# Patient Record
Sex: MALE | Race: WHITE | Employment: UNEMPLOYED | URBAN - METROPOLITAN AREA
[De-identification: names, ages, dates, MRNs, and addresses within clinical notes are randomized per-mention and may not be internally consistent; named-entity substitution may affect disease eponyms.]

---

## 2017-01-10 PROBLEM — S63.659D SAGITTAL BAND RUPTURE AT METACARPOPHALANGEAL JOINT, SUBSEQUENT ENCOUNTER: Status: ACTIVE | Noted: 2017-01-10

## 2017-05-23 PROBLEM — Z48.89 AFTERCARE FOLLOWING SURGERY: Status: ACTIVE | Noted: 2017-05-23

## 2017-06-30 PROCEDURE — 82746 ASSAY OF FOLIC ACID SERUM: CPT | Performed by: INTERNAL MEDICINE

## 2017-06-30 PROCEDURE — 82607 VITAMIN B-12: CPT | Performed by: INTERNAL MEDICINE

## 2017-11-22 PROBLEM — R52 SCAR PAINFUL: Status: ACTIVE | Noted: 2017-11-22

## 2017-11-22 PROBLEM — L90.5 SCAR PAINFUL: Status: ACTIVE | Noted: 2017-11-22

## 2018-04-13 PROCEDURE — 81015 MICROSCOPIC EXAM OF URINE: CPT | Performed by: UROLOGY

## 2018-08-21 PROCEDURE — 82746 ASSAY OF FOLIC ACID SERUM: CPT | Performed by: INTERNAL MEDICINE

## 2018-08-21 PROCEDURE — 82607 VITAMIN B-12: CPT | Performed by: INTERNAL MEDICINE

## 2019-01-21 NOTE — H&P
[address]    PATIENT'S NAME: Raymon Mcclendon   ATTENDING PHYSICIAN: Ajit Bland M.D.    PATIENT ACCOUNT#:   [de-identified]    LOCATION:    MEDICAL RECORD #:   [id number]       YOB: 1965  ADMISSION DATE:       02/01/2019    HISTORY AND [facility]. That study did demonstrate glenohumeral joint osteoarthritis. There were no articular surface or full-thickness rotator cuff tears. There was no fatty atrophy of the rotator cuff.   The long head of the biceps appeared to be normal.  There repair of right middle digit, radial sagittal band and right extensor tendon centralization 2017; spinal cord neurostimulator 2016; trial spinal cord stimulator; epidural injections; hemorrhoid ligation; C5-C6 hemicorpectomy with anterior cervical decompre M.D.  d: 01/20/2019 14:00:28  t: 01/20/2019 14:43:54  [facility] [id number]  [facility]/    cc: SIRENA Lara Dr. No

## 2019-01-23 RX ORDER — ALBUTEROL SULFATE 90 UG/1
AEROSOL, METERED RESPIRATORY (INHALATION) EVERY 6 HOURS PRN
COMMUNITY
End: 2019-06-10

## 2019-01-28 NOTE — H&P
[facility]    PATIENT'S NAME: Surekha Olga   ATTENDING PHYSICIAN: Uzma Tijerina M.D.    PATIENT ACCOUNT#:   [de-identified]    LOCATION:    MEDICAL RECORD #:   [id number]       YOB: 1965  ADMISSION DATE:       02/01/2019    HISTORY AND PH

## 2019-02-01 ENCOUNTER — ANESTHESIA (OUTPATIENT)
Dept: SURGERY | Facility: HOSPITAL | Age: 54
DRG: 483 | End: 2019-02-01
Payer: COMMERCIAL

## 2019-02-01 ENCOUNTER — APPOINTMENT (OUTPATIENT)
Dept: GENERAL RADIOLOGY | Facility: HOSPITAL | Age: 54
DRG: 483 | End: 2019-02-01
Attending: ORTHOPAEDIC SURGERY
Payer: COMMERCIAL

## 2019-02-01 ENCOUNTER — ANESTHESIA EVENT (OUTPATIENT)
Dept: SURGERY | Facility: HOSPITAL | Age: 54
DRG: 483 | End: 2019-02-01
Payer: COMMERCIAL

## 2019-02-01 ENCOUNTER — HOSPITAL ENCOUNTER (INPATIENT)
Facility: HOSPITAL | Age: 54
LOS: 1 days | Discharge: HOME | DRG: 483 | End: 2019-02-02
Attending: ORTHOPAEDIC SURGERY | Admitting: SURGERY
Payer: COMMERCIAL

## 2019-02-01 DIAGNOSIS — M19.012 PRIMARY OSTEOARTHRITIS OF LEFT SHOULDER: ICD-10-CM

## 2019-02-01 PROBLEM — M19.019 OSTEOARTHRITIS, SHOULDER: Status: ACTIVE | Noted: 2019-02-01

## 2019-02-01 PROBLEM — M19.011 PRIMARY OSTEOARTHRITIS OF RIGHT SHOULDER: Status: ACTIVE | Noted: 2019-02-01

## 2019-02-01 PROBLEM — M19.011 PRIMARY OSTEOARTHRITIS OF RIGHT SHOULDER: Status: RESOLVED | Noted: 2019-02-01 | Resolved: 2019-02-01

## 2019-02-01 LAB
ANTIBODY SCREEN: NEGATIVE
RH BLOOD TYPE: NEGATIVE

## 2019-02-01 PROCEDURE — 3E0T3BZ INTRODUCTION OF ANESTHETIC AGENT INTO PERIPHERAL NERVES AND PLEXI, PERCUTANEOUS APPROACH: ICD-10-PCS | Performed by: ORTHOPAEDIC SURGERY

## 2019-02-01 PROCEDURE — 86901 BLOOD TYPING SEROLOGIC RH(D): CPT

## 2019-02-01 PROCEDURE — 88305 TISSUE EXAM BY PATHOLOGIST: CPT | Performed by: ORTHOPAEDIC SURGERY

## 2019-02-01 PROCEDURE — 86850 RBC ANTIBODY SCREEN: CPT

## 2019-02-01 PROCEDURE — 73020 X-RAY EXAM OF SHOULDER: CPT | Performed by: ORTHOPAEDIC SURGERY

## 2019-02-01 PROCEDURE — 88311 DECALCIFY TISSUE: CPT | Performed by: ORTHOPAEDIC SURGERY

## 2019-02-01 PROCEDURE — 86900 BLOOD TYPING SEROLOGIC ABO: CPT

## 2019-02-01 PROCEDURE — 0RRK0JZ REPLACEMENT OF LEFT SHOULDER JOINT WITH SYNTHETIC SUBSTITUTE, OPEN APPROACH: ICD-10-PCS | Performed by: ORTHOPAEDIC SURGERY

## 2019-02-01 PROCEDURE — 76942 ECHO GUIDE FOR BIOPSY: CPT | Performed by: ORTHOPAEDIC SURGERY

## 2019-02-01 DEVICE — BIOMET BC R 1X40 US: Type: IMPLANTABLE DEVICE | Site: SHOULDER | Status: FUNCTIONAL

## 2019-02-01 RX ORDER — TRAMADOL HYDROCHLORIDE 50 MG/1
50 TABLET ORAL EVERY 8 HOURS PRN
Status: ON HOLD | COMMUNITY
End: 2019-02-02

## 2019-02-01 RX ORDER — SODIUM CHLORIDE, SODIUM LACTATE, POTASSIUM CHLORIDE, CALCIUM CHLORIDE 600; 310; 30; 20 MG/100ML; MG/100ML; MG/100ML; MG/100ML
INJECTION, SOLUTION INTRAVENOUS CONTINUOUS
Status: DISCONTINUED | OUTPATIENT
Start: 2019-02-01 | End: 2019-02-02

## 2019-02-01 RX ORDER — SCOLOPAMINE TRANSDERMAL SYSTEM 1 MG/1
1 PATCH, EXTENDED RELEASE TRANSDERMAL ONCE
Status: DISCONTINUED | OUTPATIENT
Start: 2019-02-01 | End: 2019-02-02

## 2019-02-01 RX ORDER — ACETAMINOPHEN 325 MG/1
650 TABLET ORAL ONCE
Status: DISCONTINUED | OUTPATIENT
Start: 2019-02-01 | End: 2019-02-01 | Stop reason: HOSPADM

## 2019-02-01 RX ORDER — ACETAMINOPHEN 500 MG
1000 TABLET ORAL ONCE AS NEEDED
Status: DISCONTINUED | OUTPATIENT
Start: 2019-02-01 | End: 2019-02-01 | Stop reason: HOSPADM

## 2019-02-01 RX ORDER — METOCLOPRAMIDE HYDROCHLORIDE 5 MG/ML
10 INJECTION INTRAMUSCULAR; INTRAVENOUS AS NEEDED
Status: DISCONTINUED | OUTPATIENT
Start: 2019-02-01 | End: 2019-02-01 | Stop reason: HOSPADM

## 2019-02-01 RX ORDER — SCOLOPAMINE TRANSDERMAL SYSTEM 1 MG/1
PATCH, EXTENDED RELEASE TRANSDERMAL
Status: DISCONTINUED
Start: 2019-02-01 | End: 2019-02-02

## 2019-02-01 RX ORDER — SODIUM CHLORIDE, SODIUM LACTATE, POTASSIUM CHLORIDE, CALCIUM CHLORIDE 600; 310; 30; 20 MG/100ML; MG/100ML; MG/100ML; MG/100ML
INJECTION, SOLUTION INTRAVENOUS CONTINUOUS
Status: DISCONTINUED | OUTPATIENT
Start: 2019-02-01 | End: 2019-02-01 | Stop reason: HOSPADM

## 2019-02-01 RX ORDER — ALBUTEROL SULFATE 90 UG/1
1 AEROSOL, METERED RESPIRATORY (INHALATION) EVERY 6 HOURS PRN
Status: DISCONTINUED | OUTPATIENT
Start: 2019-02-01 | End: 2019-02-02

## 2019-02-01 RX ORDER — BENZONATATE 200 MG/1
200 CAPSULE ORAL 3 TIMES DAILY
Status: DISCONTINUED | OUTPATIENT
Start: 2019-02-01 | End: 2019-02-02

## 2019-02-01 RX ORDER — OXYCODONE HYDROCHLORIDE 10 MG/1
10 TABLET ORAL EVERY 4 HOURS PRN
Status: DISCONTINUED | OUTPATIENT
Start: 2019-02-01 | End: 2019-02-02

## 2019-02-01 RX ORDER — KETOROLAC TROMETHAMINE 30 MG/ML
30 INJECTION, SOLUTION INTRAMUSCULAR; INTRAVENOUS EVERY 6 HOURS
Status: DISCONTINUED | OUTPATIENT
Start: 2019-02-01 | End: 2019-02-02

## 2019-02-01 RX ORDER — ACETAMINOPHEN 325 MG/1
650 TABLET ORAL ONCE
Status: COMPLETED | OUTPATIENT
Start: 2019-02-01 | End: 2019-02-01

## 2019-02-01 RX ORDER — HYDROMORPHONE HYDROCHLORIDE 1 MG/ML
0.8 INJECTION, SOLUTION INTRAMUSCULAR; INTRAVENOUS; SUBCUTANEOUS EVERY 2 HOUR PRN
Status: DISCONTINUED | OUTPATIENT
Start: 2019-02-01 | End: 2019-02-02

## 2019-02-01 RX ORDER — HYDROMORPHONE HYDROCHLORIDE 1 MG/ML
INJECTION, SOLUTION INTRAMUSCULAR; INTRAVENOUS; SUBCUTANEOUS
Status: COMPLETED
Start: 2019-02-01 | End: 2019-02-01

## 2019-02-01 RX ORDER — SODIUM CHLORIDE, SODIUM LACTATE, POTASSIUM CHLORIDE, CALCIUM CHLORIDE 600; 310; 30; 20 MG/100ML; MG/100ML; MG/100ML; MG/100ML
INJECTION, SOLUTION INTRAVENOUS CONTINUOUS
Status: DISCONTINUED | OUTPATIENT
Start: 2019-02-01 | End: 2019-02-01

## 2019-02-01 RX ORDER — HYDROMORPHONE HYDROCHLORIDE 1 MG/ML
0.8 INJECTION, SOLUTION INTRAMUSCULAR; INTRAVENOUS; SUBCUTANEOUS EVERY 2 HOUR PRN
Status: DISCONTINUED | OUTPATIENT
Start: 2019-02-01 | End: 2019-02-01

## 2019-02-01 RX ORDER — OXYCODONE HYDROCHLORIDE 10 MG/1
20 TABLET ORAL EVERY 4 HOURS PRN
Status: DISCONTINUED | OUTPATIENT
Start: 2019-02-01 | End: 2019-02-02

## 2019-02-01 RX ORDER — DULOXETIN HYDROCHLORIDE 30 MG/1
90 CAPSULE, DELAYED RELEASE ORAL DAILY
Status: DISCONTINUED | OUTPATIENT
Start: 2019-02-02 | End: 2019-02-02

## 2019-02-01 RX ORDER — DIAZEPAM 10 MG/1
10 TABLET ORAL NIGHTLY PRN
Status: DISCONTINUED | OUTPATIENT
Start: 2019-02-01 | End: 2019-02-02

## 2019-02-01 RX ORDER — ACETAMINOPHEN 500 MG
1000 TABLET ORAL ONCE
Status: DISCONTINUED | OUTPATIENT
Start: 2019-02-01 | End: 2019-02-01

## 2019-02-01 RX ORDER — OXYCODONE HCL 10 MG/1
TABLET, FILM COATED, EXTENDED RELEASE ORAL
Status: COMPLETED
Start: 2019-02-01 | End: 2019-02-01

## 2019-02-01 RX ORDER — ONDANSETRON 2 MG/ML
4 INJECTION INTRAMUSCULAR; INTRAVENOUS AS NEEDED
Status: DISCONTINUED | OUTPATIENT
Start: 2019-02-01 | End: 2019-02-01 | Stop reason: HOSPADM

## 2019-02-01 RX ORDER — HYDROMORPHONE HYDROCHLORIDE 1 MG/ML
0.2 INJECTION, SOLUTION INTRAMUSCULAR; INTRAVENOUS; SUBCUTANEOUS EVERY 2 HOUR PRN
Status: DISCONTINUED | OUTPATIENT
Start: 2019-02-01 | End: 2019-02-01

## 2019-02-01 RX ORDER — SCOLOPAMINE TRANSDERMAL SYSTEM 1 MG/1
1 PATCH, EXTENDED RELEASE TRANSDERMAL ONCE
Status: DISCONTINUED | OUTPATIENT
Start: 2019-02-01 | End: 2019-02-01

## 2019-02-01 RX ORDER — HYDROMORPHONE HYDROCHLORIDE 1 MG/ML
0.2 INJECTION, SOLUTION INTRAMUSCULAR; INTRAVENOUS; SUBCUTANEOUS EVERY 2 HOUR PRN
Status: DISCONTINUED | OUTPATIENT
Start: 2019-02-01 | End: 2019-02-02

## 2019-02-01 RX ORDER — SCOLOPAMINE TRANSDERMAL SYSTEM 1 MG/1
1 PATCH, EXTENDED RELEASE TRANSDERMAL ONCE
Status: DISCONTINUED | OUTPATIENT
Start: 2019-02-01 | End: 2019-02-01 | Stop reason: HOSPADM

## 2019-02-01 RX ORDER — HYDROMORPHONE HYDROCHLORIDE 1 MG/ML
0.4 INJECTION, SOLUTION INTRAMUSCULAR; INTRAVENOUS; SUBCUTANEOUS EVERY 5 MIN PRN
Status: DISCONTINUED | OUTPATIENT
Start: 2019-02-01 | End: 2019-02-01 | Stop reason: HOSPADM

## 2019-02-01 RX ORDER — TIZANIDINE 4 MG/1
4 TABLET ORAL 3 TIMES DAILY PRN
Status: DISCONTINUED | OUTPATIENT
Start: 2019-02-01 | End: 2019-02-01

## 2019-02-01 RX ORDER — POLYETHYLENE GLYCOL 3350 17 G/17G
17 POWDER, FOR SOLUTION ORAL DAILY PRN
Status: DISCONTINUED | OUTPATIENT
Start: 2019-02-01 | End: 2019-02-02

## 2019-02-01 RX ORDER — KETOROLAC TROMETHAMINE 30 MG/ML
30 INJECTION, SOLUTION INTRAMUSCULAR; INTRAVENOUS EVERY 6 HOURS
Status: DISCONTINUED | OUTPATIENT
Start: 2019-02-01 | End: 2019-02-01

## 2019-02-01 RX ORDER — METOCLOPRAMIDE HYDROCHLORIDE 5 MG/ML
INJECTION INTRAMUSCULAR; INTRAVENOUS
Status: COMPLETED
Start: 2019-02-01 | End: 2019-02-01

## 2019-02-01 RX ORDER — MIDAZOLAM HYDROCHLORIDE 1 MG/ML
1 INJECTION INTRAMUSCULAR; INTRAVENOUS EVERY 5 MIN PRN
Status: DISCONTINUED | OUTPATIENT
Start: 2019-02-01 | End: 2019-02-01 | Stop reason: HOSPADM

## 2019-02-01 RX ORDER — DIPHENHYDRAMINE HYDROCHLORIDE 50 MG/ML
25 INJECTION INTRAMUSCULAR; INTRAVENOUS ONCE AS NEEDED
Status: ACTIVE | OUTPATIENT
Start: 2019-02-01 | End: 2019-02-01

## 2019-02-01 RX ORDER — LABETALOL HYDROCHLORIDE 5 MG/ML
5 INJECTION, SOLUTION INTRAVENOUS EVERY 5 MIN PRN
Status: DISCONTINUED | OUTPATIENT
Start: 2019-02-01 | End: 2019-02-01 | Stop reason: HOSPADM

## 2019-02-01 RX ORDER — TIZANIDINE 4 MG/1
4 TABLET ORAL 3 TIMES DAILY PRN
Status: DISCONTINUED | OUTPATIENT
Start: 2019-02-01 | End: 2019-02-02

## 2019-02-01 RX ORDER — METOCLOPRAMIDE HYDROCHLORIDE 5 MG/ML
10 INJECTION INTRAMUSCULAR; INTRAVENOUS EVERY 6 HOURS PRN
Status: DISCONTINUED | OUTPATIENT
Start: 2019-02-01 | End: 2019-02-02

## 2019-02-01 RX ORDER — OXYCODONE HYDROCHLORIDE 15 MG/1
15 TABLET ORAL EVERY 4 HOURS PRN
Status: DISCONTINUED | OUTPATIENT
Start: 2019-02-01 | End: 2019-02-01

## 2019-02-01 RX ORDER — OXYCODONE HCL 10 MG/1
10 TABLET, FILM COATED, EXTENDED RELEASE ORAL
Status: ACTIVE | OUTPATIENT
Start: 2019-02-02 | End: 2019-02-02

## 2019-02-01 RX ORDER — OXYCODONE HYDROCHLORIDE 15 MG/1
15 TABLET ORAL EVERY 4 HOURS PRN
Status: DISCONTINUED | OUTPATIENT
Start: 2019-02-01 | End: 2019-02-02

## 2019-02-01 RX ORDER — HYDROCODONE BITARTRATE AND ACETAMINOPHEN 10; 325 MG/1; MG/1
TABLET ORAL
Qty: 60 TABLET | Refills: 0 | Status: SHIPPED | OUTPATIENT
Start: 2019-02-01 | End: 2019-03-04

## 2019-02-01 RX ORDER — OXYCODONE HCL 10 MG/1
10 TABLET, FILM COATED, EXTENDED RELEASE ORAL
Status: DISCONTINUED | OUTPATIENT
Start: 2019-02-01 | End: 2019-02-01 | Stop reason: HOSPADM

## 2019-02-01 RX ORDER — TRAMADOL HYDROCHLORIDE 50 MG/1
50 TABLET ORAL EVERY 6 HOURS
Status: DISCONTINUED | OUTPATIENT
Start: 2019-02-01 | End: 2019-02-01

## 2019-02-01 RX ORDER — OXYCODONE HYDROCHLORIDE 10 MG/1
10 TABLET ORAL EVERY 4 HOURS PRN
Status: DISCONTINUED | OUTPATIENT
Start: 2019-02-01 | End: 2019-02-01

## 2019-02-01 RX ORDER — BISACODYL 10 MG
10 SUPPOSITORY, RECTAL RECTAL
Status: DISCONTINUED | OUTPATIENT
Start: 2019-02-01 | End: 2019-02-02

## 2019-02-01 RX ORDER — SERTRALINE HYDROCHLORIDE 100 MG/1
100 TABLET, FILM COATED ORAL EVERY EVENING
Status: DISCONTINUED | OUTPATIENT
Start: 2019-02-01 | End: 2019-02-01

## 2019-02-01 RX ORDER — SODIUM CHLORIDE 9 MG/ML
INJECTION, SOLUTION INTRAVENOUS CONTINUOUS
Status: DISCONTINUED | OUTPATIENT
Start: 2019-02-01 | End: 2019-02-02

## 2019-02-01 RX ORDER — SERTRALINE HYDROCHLORIDE 100 MG/1
100 TABLET, FILM COATED ORAL NIGHTLY
Status: DISCONTINUED | OUTPATIENT
Start: 2019-02-01 | End: 2019-02-02

## 2019-02-01 RX ORDER — ACETAMINOPHEN 500 MG
1000 TABLET ORAL ONCE
Status: DISCONTINUED | OUTPATIENT
Start: 2019-02-01 | End: 2019-02-02

## 2019-02-01 RX ORDER — DOCUSATE SODIUM 100 MG/1
100 CAPSULE, LIQUID FILLED ORAL 2 TIMES DAILY
Status: DISCONTINUED | OUTPATIENT
Start: 2019-02-01 | End: 2019-02-02

## 2019-02-01 RX ORDER — NALOXONE HYDROCHLORIDE 0.4 MG/ML
80 INJECTION, SOLUTION INTRAMUSCULAR; INTRAVENOUS; SUBCUTANEOUS AS NEEDED
Status: DISCONTINUED | OUTPATIENT
Start: 2019-02-01 | End: 2019-02-01 | Stop reason: HOSPADM

## 2019-02-01 RX ORDER — TRAMADOL HYDROCHLORIDE 50 MG/1
50 TABLET ORAL EVERY 6 HOURS
Status: DISCONTINUED | OUTPATIENT
Start: 2019-02-01 | End: 2019-02-02

## 2019-02-01 RX ORDER — OXYCODONE HYDROCHLORIDE 10 MG/1
20 TABLET ORAL EVERY 4 HOURS PRN
Status: DISCONTINUED | OUTPATIENT
Start: 2019-02-01 | End: 2019-02-01

## 2019-02-01 RX ORDER — DIPHENHYDRAMINE HYDROCHLORIDE 50 MG/ML
12.5 INJECTION INTRAMUSCULAR; INTRAVENOUS AS NEEDED
Status: DISCONTINUED | OUTPATIENT
Start: 2019-02-01 | End: 2019-02-01 | Stop reason: HOSPADM

## 2019-02-01 RX ORDER — ACETAMINOPHEN 325 MG/1
TABLET ORAL
Status: COMPLETED
Start: 2019-02-01 | End: 2019-02-01

## 2019-02-01 RX ORDER — OXYCODONE HCL 10 MG/1
10 TABLET, FILM COATED, EXTENDED RELEASE ORAL
Status: COMPLETED | OUTPATIENT
Start: 2019-02-01 | End: 2019-02-01

## 2019-02-01 RX ORDER — SODIUM PHOSPHATE, DIBASIC AND SODIUM PHOSPHATE, MONOBASIC 7; 19 G/133ML; G/133ML
1 ENEMA RECTAL ONCE AS NEEDED
Status: DISCONTINUED | OUTPATIENT
Start: 2019-02-01 | End: 2019-02-02

## 2019-02-01 RX ORDER — ACETAMINOPHEN 325 MG/1
650 TABLET ORAL 4 TIMES DAILY
Status: DISCONTINUED | OUTPATIENT
Start: 2019-02-01 | End: 2019-02-02

## 2019-02-01 RX ORDER — ONDANSETRON 2 MG/ML
4 INJECTION INTRAMUSCULAR; INTRAVENOUS EVERY 4 HOURS PRN
Status: DISCONTINUED | OUTPATIENT
Start: 2019-02-01 | End: 2019-02-02

## 2019-02-01 RX ORDER — HYDROMORPHONE HYDROCHLORIDE 1 MG/ML
0.4 INJECTION, SOLUTION INTRAMUSCULAR; INTRAVENOUS; SUBCUTANEOUS EVERY 2 HOUR PRN
Status: DISCONTINUED | OUTPATIENT
Start: 2019-02-01 | End: 2019-02-01

## 2019-02-01 RX ORDER — HYDROMORPHONE HYDROCHLORIDE 1 MG/ML
0.4 INJECTION, SOLUTION INTRAMUSCULAR; INTRAVENOUS; SUBCUTANEOUS EVERY 2 HOUR PRN
Status: DISCONTINUED | OUTPATIENT
Start: 2019-02-01 | End: 2019-02-02

## 2019-02-01 NOTE — INTERVAL H&P NOTE
Pre-op Diagnosis: Primary osteoarthritis of left shoulder [M19.012]    The above referenced H&P was reviewed by SAKINA Mckeon on 2/1/2019, the patient was examined and no significant changes have occurred in the patient's condition since the H&P was

## 2019-02-01 NOTE — PROGRESS NOTES
NURSING ADMISSION NOTE      Patient admitted via BED  Oriented to room. Safety precautions initiated. Bed in low position. Call light in reach.   Dr. Edelmira Bhatt made aware of the consult

## 2019-02-01 NOTE — ANESTHESIA POSTPROCEDURE EVALUATION
[facility],3Rd Floor Patient Status:  Surgery Admit   Age/Gender 48year old male MRN [id number]   [facility] SURGERY Attending Charlet Gowers, MD   Hosp Day # 0 PCP Annie Torrez MD       Anesthesia Post-op Note    Procedure(s)

## 2019-02-01 NOTE — ANESTHESIA PREPROCEDURE EVALUATION
PRE-OP EVALUATION    Patient Name: Nicole     Pre-op Diagnosis: Primary osteoarthritis of left shoulder [M19.012]    Procedure(s):  LEFT TOTAL SHOULDER ARTHROPLASTY    Surgeon(s) and Role:     Jasper Medina MD - Primary    Pre-op vitals reviewed nightly as needed. At Bedtime (Patient taking differently: Take 10 mg by mouth nightly. At Bedtime ) Disp: 30 tablet Rfl: 5   DULoxetine HCl 30 MG Oral Cap DR Particles Take 3 capsules (90 mg total) by mouth daily.  Disp: 90 capsule Rfl: 5   Cromolyn Sodium UNLISTED Right 4/28/17    Right long finger radial sagittal band reconstruction of the metacarpophalangeal joint using a palmaris longus autograft    • LONG FINGER METACARPOPHALANGEAL JOINT SAGITTAL BAND RECONSTRUCTION Right 4/28/2017    Performed by [facility]        Packs/day: 0.50        Years: 20.00        Pack years: 10      Smokeless tobacco: Never Used      Tobacco comment: Down to 2-3 cigaretts a day    Alcohol use: No      Drug use: No     Available pre-op labs reviewed.   Lab Results   Compone

## 2019-02-01 NOTE — CONSULTS
[facility] hospitalist initial consult note  Annie Hogue MD  Consulted at the request of Dr. Monik Grider  Reason for consult medical co-management  HPI 47 yo male with multiple medical problems including but not limited to GERD, Depression here s/p LEFT TOTAL S Ramírez   • LUMBAR EPIDURAL N/A 8/13/2014    Performed by Alfie Hooper MD at [address]   • OTHER Right 2/20/17    Right middle finger radial sagittal band reconstruction and right middle finger extensor tendon centralization pro on file      Food insecurity - worry: Not on file      Food insecurity - inability: Not on file      Transportation needs - medical: Not on file      Transportation needs - non-medical: Not on file    Occupational History      Not on file    Tobacco Use Cap DR Particles Take 3 capsules (90 mg total) by mouth daily. Disp: 90 capsule Rfl: 5   Cromolyn Sodium 4 % Ophthalmic Solution Place 1 drop into both eyes 2 (two) times daily as needed.  (Patient taking differently: Place 1 drop into both eyes 2 (two) armando

## 2019-02-01 NOTE — BRIEF OP NOTE
Pre-Operative Diagnosis: Primary osteoarthritis of left shoulder [M19.012]     Post-Operative Diagnosis: Primary osteoarthritis of left shoulder [M19.012]      Procedure Performed:   Procedure(s):  LEFT TOTAL SHOULDER ARTHROPLASTY    Surgeon(s) and Role:

## 2019-02-02 VITALS
OXYGEN SATURATION: 95 % | HEIGHT: 71 IN | WEIGHT: 146.38 LBS | RESPIRATION RATE: 18 BRPM | HEART RATE: 77 BPM | DIASTOLIC BLOOD PRESSURE: 85 MMHG | BODY MASS INDEX: 20.49 KG/M2 | SYSTOLIC BLOOD PRESSURE: 125 MMHG | TEMPERATURE: 99 F

## 2019-02-02 LAB
DEPRECATED RDW RBC AUTO: 42.5 FL (ref 35.1–46.3)
ERYTHROCYTE [DISTWIDTH] IN BLOOD BY AUTOMATED COUNT: 11.7 % (ref 11–15)
HCT VFR BLD AUTO: 32.5 % (ref 39–53)
HGB BLD-MCNC: 11.2 G/DL (ref 13–17.5)
MCH RBC QN AUTO: 34 PG (ref 26–34)
MCHC RBC AUTO-ENTMCNC: 34.5 G/DL (ref 31–37)
MCV RBC AUTO: 98.8 FL (ref 80–100)
PLATELET # BLD AUTO: 210 10(3)UL (ref 150–450)
RBC # BLD AUTO: 3.29 X10(6)UL (ref 4.3–5.7)
WBC # BLD AUTO: 13.9 X10(3) UL (ref 4–11)

## 2019-02-02 PROCEDURE — 97165 OT EVAL LOW COMPLEX 30 MIN: CPT

## 2019-02-02 PROCEDURE — 97161 PT EVAL LOW COMPLEX 20 MIN: CPT

## 2019-02-02 PROCEDURE — 97116 GAIT TRAINING THERAPY: CPT

## 2019-02-02 PROCEDURE — 85027 COMPLETE CBC AUTOMATED: CPT | Performed by: NURSE PRACTITIONER

## 2019-02-02 PROCEDURE — 94664 DEMO&/EVAL PT USE INHALER: CPT

## 2019-02-02 RX ORDER — PSEUDOEPHEDRINE HCL 30 MG
100 TABLET ORAL 2 TIMES DAILY
Qty: 20 CAPSULE | Refills: 0 | Status: SHIPPED | OUTPATIENT
Start: 2019-02-02 | End: 2019-03-04

## 2019-02-02 RX ORDER — HYDROCODONE BITARTRATE AND ACETAMINOPHEN 10; 325 MG/1; MG/1
2 TABLET ORAL EVERY 4 HOURS PRN
Status: DISCONTINUED | OUTPATIENT
Start: 2019-02-02 | End: 2019-02-02

## 2019-02-02 RX ORDER — HYDROCODONE BITARTRATE AND ACETAMINOPHEN 10; 325 MG/1; MG/1
1 TABLET ORAL EVERY 4 HOURS PRN
Status: DISCONTINUED | OUTPATIENT
Start: 2019-02-02 | End: 2019-02-02

## 2019-02-02 NOTE — PROGRESS NOTES
[facility]  Orthopedic Surgery  Progress Note    [place] Patient Status:  Inpatient    1965 MRN [id number]   [facility] 3SW-A Attending Kb Price MD   Hosp Day # 1 PCP Paulino Barboza MD     SUBJECTIVE:  INTERVAL

## 2019-02-02 NOTE — PLAN OF CARE
PAIN - ADULT    • Verbalizes/displays adequate comfort level or patient's stated pain goal Progressing        Patient/Family Goals    • Patient/Family Short Term Goal Progressing        SAFETY ADULT - FALL    • Free from fall injury Progressing          A/

## 2019-02-02 NOTE — OPERATIVE REPORT
[facility]    PATIENT'S NAME: Ross Aranda   ATTENDING PHYSICIAN: Deborah Thomas M.D. OPERATING PHYSICIAN: Deborah Thomas M.D.    PATIENT ACCOUNT#:   [de-identified]    LOCATION:  [address] [place] [address]  MEDICAL RECORD #:   [id number]       DATE OF BIRTH:  [birth year] which required cauterization. A deltoid retractor was positioned. The shoulder was inspected. The rotator cuff was intact.   We placed #2 Ethibond suture in the subscapularis, and the subscapularis was released near its insertion on the lesser tuberosity plate.  The trial was removed. The shoulder was copiously irrigated. I then chose my final components.   I chose a porous titanium glenoid post from [employer], a [employer] product, and then impacted the post onto the hybrid glenoid baseplate, which was polye discussed with the patient's family, and postoperative instructions were written. The assistant surgeon was mandatory to assist with position of the patient, retraction, removal of the excess cement, closure of the incision, and application of dressings.

## 2019-02-02 NOTE — PLAN OF CARE
DISCHARGE PLANNING    • Discharge to home or other facility with appropriate resources Adequate for Discharge        Impaired Activities of Daily Living    • Achieve highest/safest level of independence in self care Adequate for Discharge        PAIN - JENNIFER

## 2019-02-02 NOTE — PROGRESS NOTES
Post Op Day 1 Ortho Note    Status Post Nerve Block:  Type of Nerve Block: Left Interscalene  Single Injection Nerve Block    Post op review: No evidence of immediate block related complications and No paresthesia noted    Assessed pt in chair.  Rates pain

## 2019-02-02 NOTE — PHYSICAL THERAPY NOTE
PHYSICAL THERAPY QUICK EVALUATION - INPATIENT    Room Number: 355/355-A  Evaluation Date: 2/2/2019  Presenting Problem: L shoulder  Physician Order: PT Eval and Treat    Problem List  Active Problems:    Osteoarthritis of left shoulder, unspecified osteo STEROID INJECTION Right 7/8/2015    Performed by Reji Hayes MD at [address]  8/13/2014    Performed by Vane Choi MD at [address]   • OTHER Right 2/20/17    Right middle finger radial s R upper extremity     L Upper Extremity: Non-Weight Bearing          PAIN ASSESSMENT  Ratin         RANGE OF MOTION AND STRENGTH ASSESSMENT  Upper extremity ROM and strength; See OT    Lower extremity ROM is within functional limits     Lower extremity walk several times while in hosp and at home. Based on this evaluation, patient's clinical presentation is stable and overall evaluation complexity is considered low.   PT Discharge Recommendations: Outpatient PT    PLAN  Patient has been evaluated and pre

## 2019-02-02 NOTE — OCCUPATIONAL THERAPY NOTE
OCCUPATIONAL THERAPY QUICK EVALUATION - INPATIENT    Room Number: 355/355-A  Evaluation Date: 2/2/2019     Type of Evaluation: Quick Eval  Presenting Problem: left shoulder OA s/p total shoulder replacement    Physician Order: IP Consult to Occupational Th JOINT SAGITTAL BAND RECONSTRUCTION Right 4/28/2017    Performed by Hill Aparicio MD at [place] [address] / TRANSFORAMINAL EPIDURAL STEROID INJECTION Bilateral 9/2/2015    Performed by Stanley Deshpande MD at [address] [place] [address]   • from home  Hand Dominance: Right  Drives: Yes  Patient Regularly Uses: None    Prior Level of Function: Independent with all ADLs and mobility. Working from home.     SUBJECTIVE  \"I can't sit still, I've been up moving all morning\"    Patient self-stated flex 0-70*. Pt demonstrated understanding of no abd/add, no ER/IR, no horizontal abd/add. Pt completed LE dressing Mod Ind seated, supine to sit, sit to stand and bathroom mobility including toilet transfer Independently.     Patient End of Session: In bed;

## 2019-02-02 NOTE — PROGRESS NOTES
[facility] hospitalist daily note  Seen/examined on 2/2/19    S; no chest pain, no SOB, no abd pain, no nausea/emesis  + passing gas and + urinating  No complaint of burning with urination  No diarrhea  No cough  Denies bleeding    Medications in Epic    PE    02

## 2019-02-16 PROCEDURE — 81001 URINALYSIS AUTO W/SCOPE: CPT | Performed by: INTERNAL MEDICINE

## 2019-02-27 PROBLEM — K40.90 RIGHT INGUINAL HERNIA: Status: ACTIVE | Noted: 2019-02-27

## 2019-03-05 PROBLEM — Z96.612 STATUS POST REPLACEMENT OF LEFT SHOULDER JOINT: Status: ACTIVE | Noted: 2019-03-05

## 2019-04-25 PROBLEM — M25.512 ACUTE PAIN OF LEFT SHOULDER: Status: ACTIVE | Noted: 2019-04-25

## 2019-05-14 PROCEDURE — 86141 C-REACTIVE PROTEIN HS: CPT | Performed by: ORTHOPAEDIC SURGERY

## 2019-06-20 PROBLEM — Z47.89 ORTHOPEDIC AFTERCARE: Status: ACTIVE | Noted: 2019-06-20

## 2019-11-06 NOTE — H&P
[address]    PATIENT'S NAME: Raymon Mcclendon   ATTENDING PHYSICIAN: Ajit Bland M.D.    PATIENT ACCOUNT#:   [de-identified]    LOCATION:    MEDICAL RECORD #:   [id number]       YOB: 1965  ADMISSION DATE:       11/22/2019    HISTORY AND PH inferior aspect of the humeral head. At his request, I have scheduled this for him at [facility] on 11/22/2019.   The procedure has been explained in detail to the patient, including need for incision, risk of anesthesia, wound infection, DVT, and ongoing pain with diabetes, heart disease, hypertension, hypercholesterolemia. Brother has diabetes, hypercholesterolemia. Sisters both have asthma. SOCIAL HISTORY:  Patient lives in [facility].   He smokes 1/2 a pack a day and has done so for 20 years, continues to

## 2019-11-22 ENCOUNTER — HOSPITAL ENCOUNTER (INPATIENT)
Facility: HOSPITAL | Age: 54
LOS: 1 days | Discharge: HOME | DRG: 483 | End: 2019-11-22
Attending: ORTHOPAEDIC SURGERY | Admitting: ORTHOPAEDIC SURGERY
Payer: COMMERCIAL

## 2019-11-22 ENCOUNTER — ANESTHESIA EVENT (OUTPATIENT)
Dept: SURGERY | Facility: HOSPITAL | Age: 54
DRG: 483 | End: 2019-11-22
Payer: COMMERCIAL

## 2019-11-22 ENCOUNTER — ANESTHESIA (OUTPATIENT)
Dept: SURGERY | Facility: HOSPITAL | Age: 54
DRG: 483 | End: 2019-11-22
Payer: COMMERCIAL

## 2019-11-22 ENCOUNTER — APPOINTMENT (OUTPATIENT)
Dept: GENERAL RADIOLOGY | Facility: HOSPITAL | Age: 54
DRG: 483 | End: 2019-11-22
Attending: ORTHOPAEDIC SURGERY
Payer: COMMERCIAL

## 2019-11-22 VITALS
WEIGHT: 157.44 LBS | RESPIRATION RATE: 20 BRPM | SYSTOLIC BLOOD PRESSURE: 121 MMHG | OXYGEN SATURATION: 93 % | BODY MASS INDEX: 22.04 KG/M2 | HEIGHT: 71 IN | HEART RATE: 75 BPM | TEMPERATURE: 98 F | DIASTOLIC BLOOD PRESSURE: 91 MMHG

## 2019-11-22 DIAGNOSIS — M19.011 PRIMARY OSTEOARTHRITIS OF RIGHT SHOULDER: ICD-10-CM

## 2019-11-22 PROCEDURE — 86901 BLOOD TYPING SEROLOGIC RH(D): CPT

## 2019-11-22 PROCEDURE — 73020 X-RAY EXAM OF SHOULDER: CPT | Performed by: ORTHOPAEDIC SURGERY

## 2019-11-22 PROCEDURE — 88311 DECALCIFY TISSUE: CPT | Performed by: ORTHOPAEDIC SURGERY

## 2019-11-22 PROCEDURE — 3E0T3BZ INTRODUCTION OF ANESTHETIC AGENT INTO PERIPHERAL NERVES AND PLEXI, PERCUTANEOUS APPROACH: ICD-10-PCS | Performed by: ANESTHESIOLOGY

## 2019-11-22 PROCEDURE — 86850 RBC ANTIBODY SCREEN: CPT

## 2019-11-22 PROCEDURE — 76942 ECHO GUIDE FOR BIOPSY: CPT | Performed by: ORTHOPAEDIC SURGERY

## 2019-11-22 PROCEDURE — 88305 TISSUE EXAM BY PATHOLOGIST: CPT | Performed by: ORTHOPAEDIC SURGERY

## 2019-11-22 PROCEDURE — 0RRJ0JZ REPLACEMENT OF RIGHT SHOULDER JOINT WITH SYNTHETIC SUBSTITUTE, OPEN APPROACH: ICD-10-PCS | Performed by: ORTHOPAEDIC SURGERY

## 2019-11-22 PROCEDURE — 86900 BLOOD TYPING SEROLOGIC ABO: CPT

## 2019-11-22 DEVICE — BIOMET BC R 1X40 US: Type: IMPLANTABLE DEVICE | Site: SHOULDER | Status: FUNCTIONAL

## 2019-11-22 DEVICE — HEAD VERSA DIAL HUM 50X21X57: Type: IMPLANTABLE DEVICE | Site: SHOULDER | Status: FUNCTIONAL

## 2019-11-22 RX ORDER — OXYCODONE HYDROCHLORIDE 5 MG/1
5 TABLET ORAL EVERY 4 HOURS PRN
Status: DISCONTINUED | OUTPATIENT
Start: 2019-11-22 | End: 2019-11-22

## 2019-11-22 RX ORDER — SODIUM CHLORIDE, SODIUM LACTATE, POTASSIUM CHLORIDE, CALCIUM CHLORIDE 600; 310; 30; 20 MG/100ML; MG/100ML; MG/100ML; MG/100ML
INJECTION, SOLUTION INTRAVENOUS CONTINUOUS
Status: DISCONTINUED | OUTPATIENT
Start: 2019-11-22 | End: 2019-11-22

## 2019-11-22 RX ORDER — METOCLOPRAMIDE HYDROCHLORIDE 5 MG/ML
10 INJECTION INTRAMUSCULAR; INTRAVENOUS AS NEEDED
Status: DISCONTINUED | OUTPATIENT
Start: 2019-11-22 | End: 2019-11-22 | Stop reason: HOSPADM

## 2019-11-22 RX ORDER — GLYCOPYRROLATE 0.2 MG/ML
INJECTION, SOLUTION INTRAMUSCULAR; INTRAVENOUS AS NEEDED
Status: DISCONTINUED | OUTPATIENT
Start: 2019-11-22 | End: 2019-11-22 | Stop reason: SURG

## 2019-11-22 RX ORDER — KETOROLAC TROMETHAMINE 30 MG/ML
30 INJECTION, SOLUTION INTRAMUSCULAR; INTRAVENOUS EVERY 6 HOURS
Status: DISCONTINUED | OUTPATIENT
Start: 2019-11-22 | End: 2019-11-22

## 2019-11-22 RX ORDER — HYDROMORPHONE HYDROCHLORIDE 1 MG/ML
0.4 INJECTION, SOLUTION INTRAMUSCULAR; INTRAVENOUS; SUBCUTANEOUS EVERY 5 MIN PRN
Status: DISCONTINUED | OUTPATIENT
Start: 2019-11-22 | End: 2019-11-22 | Stop reason: HOSPADM

## 2019-11-22 RX ORDER — SCOLOPAMINE TRANSDERMAL SYSTEM 1 MG/1
1 PATCH, EXTENDED RELEASE TRANSDERMAL ONCE
Status: DISCONTINUED | OUTPATIENT
Start: 2019-11-22 | End: 2019-11-22

## 2019-11-22 RX ORDER — SODIUM PHOSPHATE, DIBASIC AND SODIUM PHOSPHATE, MONOBASIC 7; 19 G/133ML; G/133ML
1 ENEMA RECTAL ONCE AS NEEDED
Status: DISCONTINUED | OUTPATIENT
Start: 2019-11-22 | End: 2019-11-22

## 2019-11-22 RX ORDER — HYDROCODONE BITARTRATE AND ACETAMINOPHEN 5; 325 MG/1; MG/1
2 TABLET ORAL AS NEEDED
Status: DISCONTINUED | OUTPATIENT
Start: 2019-11-22 | End: 2019-11-22 | Stop reason: HOSPADM

## 2019-11-22 RX ORDER — ONDANSETRON 2 MG/ML
4 INJECTION INTRAMUSCULAR; INTRAVENOUS EVERY 4 HOURS PRN
Status: DISCONTINUED | OUTPATIENT
Start: 2019-11-22 | End: 2019-11-22

## 2019-11-22 RX ORDER — DIPHENHYDRAMINE HYDROCHLORIDE 50 MG/ML
25 INJECTION INTRAMUSCULAR; INTRAVENOUS ONCE AS NEEDED
Status: DISCONTINUED | OUTPATIENT
Start: 2019-11-22 | End: 2019-11-22

## 2019-11-22 RX ORDER — SODIUM CHLORIDE 9 MG/ML
INJECTION, SOLUTION INTRAVENOUS CONTINUOUS
Status: DISCONTINUED | OUTPATIENT
Start: 2019-11-22 | End: 2019-11-22 | Stop reason: HOSPADM

## 2019-11-22 RX ORDER — ENOXAPARIN SODIUM 100 MG/ML
40 INJECTION SUBCUTANEOUS DAILY
Status: CANCELLED | OUTPATIENT
Start: 2019-11-22

## 2019-11-22 RX ORDER — TIZANIDINE 4 MG/1
4 TABLET ORAL 3 TIMES DAILY PRN
Status: DISCONTINUED | OUTPATIENT
Start: 2019-11-22 | End: 2019-11-22

## 2019-11-22 RX ORDER — CEFAZOLIN SODIUM/WATER 2 G/20 ML
2 SYRINGE (ML) INTRAVENOUS ONCE
Status: COMPLETED | OUTPATIENT
Start: 2019-11-22 | End: 2019-11-22

## 2019-11-22 RX ORDER — KETOROLAC TROMETHAMINE 30 MG/ML
INJECTION, SOLUTION INTRAMUSCULAR; INTRAVENOUS AS NEEDED
Status: DISCONTINUED | OUTPATIENT
Start: 2019-11-22 | End: 2019-11-22 | Stop reason: SURG

## 2019-11-22 RX ORDER — SODIUM CHLORIDE 9 MG/ML
INJECTION, SOLUTION INTRAVENOUS CONTINUOUS
Status: DISCONTINUED | OUTPATIENT
Start: 2019-11-22 | End: 2019-11-22

## 2019-11-22 RX ORDER — NEOSTIGMINE METHYLSULFATE 1 MG/ML
INJECTION INTRAVENOUS AS NEEDED
Status: DISCONTINUED | OUTPATIENT
Start: 2019-11-22 | End: 2019-11-22 | Stop reason: SURG

## 2019-11-22 RX ORDER — CEFAZOLIN SODIUM/WATER 2 G/20 ML
2 SYRINGE (ML) INTRAVENOUS EVERY 8 HOURS
Status: DISCONTINUED | OUTPATIENT
Start: 2019-11-22 | End: 2019-11-22

## 2019-11-22 RX ORDER — ROPIVACAINE HYDROCHLORIDE 5 MG/ML
INJECTION, SOLUTION EPIDURAL; INFILTRATION; PERINEURAL AS NEEDED
Status: DISCONTINUED | OUTPATIENT
Start: 2019-11-22 | End: 2019-11-22 | Stop reason: SURG

## 2019-11-22 RX ORDER — HYDROCODONE BITARTRATE AND ACETAMINOPHEN 5; 325 MG/1; MG/1
2 TABLET ORAL EVERY 4 HOURS PRN
Status: DISCONTINUED | OUTPATIENT
Start: 2019-11-23 | End: 2019-11-22

## 2019-11-22 RX ORDER — ROCURONIUM BROMIDE 10 MG/ML
INJECTION, SOLUTION INTRAVENOUS AS NEEDED
Status: DISCONTINUED | OUTPATIENT
Start: 2019-11-22 | End: 2019-11-22 | Stop reason: SURG

## 2019-11-22 RX ORDER — DULOXETIN HYDROCHLORIDE 30 MG/1
90 CAPSULE, DELAYED RELEASE ORAL DAILY
COMMUNITY
End: 2020-01-15 | Stop reason: ALTCHOICE

## 2019-11-22 RX ORDER — ERGOCALCIFEROL (VITAMIN D2) 1250 MCG
50000 CAPSULE ORAL WEEKLY
Status: ON HOLD | COMMUNITY
End: 2020-06-03

## 2019-11-22 RX ORDER — HYDROMORPHONE HYDROCHLORIDE 1 MG/ML
0.4 INJECTION, SOLUTION INTRAMUSCULAR; INTRAVENOUS; SUBCUTANEOUS EVERY 2 HOUR PRN
Status: DISCONTINUED | OUTPATIENT
Start: 2019-11-22 | End: 2019-11-22

## 2019-11-22 RX ORDER — POLYETHYLENE GLYCOL 3350 17 G/17G
17 POWDER, FOR SOLUTION ORAL DAILY PRN
Status: DISCONTINUED | OUTPATIENT
Start: 2019-11-22 | End: 2019-11-22

## 2019-11-22 RX ORDER — MAGNESIUM HYDROXIDE 1200 MG/15ML
LIQUID ORAL CONTINUOUS PRN
Status: COMPLETED | OUTPATIENT
Start: 2019-11-22 | End: 2019-11-22

## 2019-11-22 RX ORDER — OXYCODONE HYDROCHLORIDE 15 MG/1
15 TABLET ORAL EVERY 4 HOURS PRN
Status: DISCONTINUED | OUTPATIENT
Start: 2019-11-22 | End: 2019-11-22

## 2019-11-22 RX ORDER — OXYCODONE HYDROCHLORIDE 10 MG/1
10 TABLET ORAL EVERY 4 HOURS PRN
Status: DISCONTINUED | OUTPATIENT
Start: 2019-11-22 | End: 2019-11-22

## 2019-11-22 RX ORDER — MIDAZOLAM HYDROCHLORIDE 1 MG/ML
INJECTION INTRAMUSCULAR; INTRAVENOUS AS NEEDED
Status: DISCONTINUED | OUTPATIENT
Start: 2019-11-22 | End: 2019-11-22 | Stop reason: SURG

## 2019-11-22 RX ORDER — BISACODYL 10 MG
10 SUPPOSITORY, RECTAL RECTAL
Status: DISCONTINUED | OUTPATIENT
Start: 2019-11-22 | End: 2019-11-22

## 2019-11-22 RX ORDER — PROCHLORPERAZINE EDISYLATE 5 MG/ML
10 INJECTION INTRAMUSCULAR; INTRAVENOUS EVERY 6 HOURS PRN
Status: DISCONTINUED | OUTPATIENT
Start: 2019-11-22 | End: 2019-11-22

## 2019-11-22 RX ORDER — HYDROCODONE BITARTRATE AND ACETAMINOPHEN 5; 325 MG/1; MG/1
1 TABLET ORAL EVERY 4 HOURS PRN
Status: DISCONTINUED | OUTPATIENT
Start: 2019-11-23 | End: 2019-11-22

## 2019-11-22 RX ORDER — METOCLOPRAMIDE HYDROCHLORIDE 5 MG/ML
10 INJECTION INTRAMUSCULAR; INTRAVENOUS EVERY 6 HOURS PRN
Status: DISCONTINUED | OUTPATIENT
Start: 2019-11-22 | End: 2019-11-22

## 2019-11-22 RX ORDER — ACETAMINOPHEN 500 MG
1000 TABLET ORAL ONCE
Status: DISCONTINUED | OUTPATIENT
Start: 2019-11-22 | End: 2019-11-22 | Stop reason: HOSPADM

## 2019-11-22 RX ORDER — TRAMADOL HYDROCHLORIDE 50 MG/1
50 TABLET ORAL EVERY 6 HOURS PRN
Status: DISCONTINUED | OUTPATIENT
Start: 2019-11-22 | End: 2019-11-22

## 2019-11-22 RX ORDER — SODIUM CHLORIDE, SODIUM LACTATE, POTASSIUM CHLORIDE, CALCIUM CHLORIDE 600; 310; 30; 20 MG/100ML; MG/100ML; MG/100ML; MG/100ML
INJECTION, SOLUTION INTRAVENOUS CONTINUOUS
Status: DISCONTINUED | OUTPATIENT
Start: 2019-11-22 | End: 2019-11-22 | Stop reason: HOSPADM

## 2019-11-22 RX ORDER — DOCUSATE SODIUM 100 MG/1
100 CAPSULE, LIQUID FILLED ORAL 2 TIMES DAILY
Status: DISCONTINUED | OUTPATIENT
Start: 2019-11-22 | End: 2019-11-22

## 2019-11-22 RX ORDER — ONDANSETRON 2 MG/ML
4 INJECTION INTRAMUSCULAR; INTRAVENOUS AS NEEDED
Status: DISCONTINUED | OUTPATIENT
Start: 2019-11-22 | End: 2019-11-22 | Stop reason: HOSPADM

## 2019-11-22 RX ORDER — LIDOCAINE HYDROCHLORIDE 10 MG/ML
INJECTION, SOLUTION EPIDURAL; INFILTRATION; INTRACAUDAL; PERINEURAL AS NEEDED
Status: DISCONTINUED | OUTPATIENT
Start: 2019-11-22 | End: 2019-11-22 | Stop reason: SURG

## 2019-11-22 RX ORDER — HYDROMORPHONE HYDROCHLORIDE 1 MG/ML
0.2 INJECTION, SOLUTION INTRAMUSCULAR; INTRAVENOUS; SUBCUTANEOUS EVERY 2 HOUR PRN
Status: DISCONTINUED | OUTPATIENT
Start: 2019-11-22 | End: 2019-11-22

## 2019-11-22 RX ORDER — SERTRALINE HYDROCHLORIDE 100 MG/1
200 TABLET, FILM COATED ORAL DAILY
COMMUNITY
End: 2020-01-15 | Stop reason: ALTCHOICE

## 2019-11-22 RX ORDER — ONDANSETRON 2 MG/ML
INJECTION INTRAMUSCULAR; INTRAVENOUS AS NEEDED
Status: DISCONTINUED | OUTPATIENT
Start: 2019-11-22 | End: 2019-11-22 | Stop reason: SURG

## 2019-11-22 RX ORDER — HYDROMORPHONE HYDROCHLORIDE 1 MG/ML
0.8 INJECTION, SOLUTION INTRAMUSCULAR; INTRAVENOUS; SUBCUTANEOUS EVERY 2 HOUR PRN
Status: DISCONTINUED | OUTPATIENT
Start: 2019-11-22 | End: 2019-11-22

## 2019-11-22 RX ORDER — TRAMADOL HYDROCHLORIDE 50 MG/1
50 TABLET ORAL EVERY 6 HOURS
Status: DISCONTINUED | OUTPATIENT
Start: 2019-11-22 | End: 2019-11-22

## 2019-11-22 RX ORDER — DEXAMETHASONE SODIUM PHOSPHATE 4 MG/ML
VIAL (ML) INJECTION AS NEEDED
Status: DISCONTINUED | OUTPATIENT
Start: 2019-11-22 | End: 2019-11-22 | Stop reason: SURG

## 2019-11-22 RX ORDER — ACETAMINOPHEN 325 MG/1
650 TABLET ORAL 4 TIMES DAILY
Status: DISCONTINUED | OUTPATIENT
Start: 2019-11-22 | End: 2019-11-22

## 2019-11-22 RX ORDER — NALOXONE HYDROCHLORIDE 0.4 MG/ML
80 INJECTION, SOLUTION INTRAMUSCULAR; INTRAVENOUS; SUBCUTANEOUS AS NEEDED
Status: DISCONTINUED | OUTPATIENT
Start: 2019-11-22 | End: 2019-11-22 | Stop reason: HOSPADM

## 2019-11-22 RX ORDER — HYDROCODONE BITARTRATE AND ACETAMINOPHEN 5; 325 MG/1; MG/1
1 TABLET ORAL AS NEEDED
Status: DISCONTINUED | OUTPATIENT
Start: 2019-11-22 | End: 2019-11-22 | Stop reason: HOSPADM

## 2019-11-22 RX ORDER — TRAMADOL HYDROCHLORIDE 50 MG/1
TABLET ORAL EVERY 6 HOURS PRN
Qty: 40 TABLET | Refills: 0 | Status: ON HOLD | OUTPATIENT
Start: 2019-11-22 | End: 2020-06-03

## 2019-11-22 RX ADMIN — SODIUM CHLORIDE, SODIUM LACTATE, POTASSIUM CHLORIDE, CALCIUM CHLORIDE: 600; 310; 30; 20 INJECTION, SOLUTION INTRAVENOUS at 08:39:00

## 2019-11-22 RX ADMIN — SODIUM CHLORIDE, SODIUM LACTATE, POTASSIUM CHLORIDE, CALCIUM CHLORIDE: 600; 310; 30; 20 INJECTION, SOLUTION INTRAVENOUS at 09:21:00

## 2019-11-22 RX ADMIN — LIDOCAINE HYDROCHLORIDE 50 MG: 10 INJECTION, SOLUTION EPIDURAL; INFILTRATION; INTRACAUDAL; PERINEURAL at 08:39:00

## 2019-11-22 RX ADMIN — KETOROLAC TROMETHAMINE 30 MG: 30 INJECTION, SOLUTION INTRAMUSCULAR; INTRAVENOUS at 09:57:00

## 2019-11-22 RX ADMIN — CEFAZOLIN SODIUM/WATER 2 G: 2 G/20 ML SYRINGE (ML) INTRAVENOUS at 08:48:00

## 2019-11-22 RX ADMIN — ROPIVACAINE HYDROCHLORIDE 30 ML: 5 INJECTION, SOLUTION EPIDURAL; INFILTRATION; PERINEURAL at 08:44:00

## 2019-11-22 RX ADMIN — GLYCOPYRROLATE 0.4 MG: 0.2 INJECTION, SOLUTION INTRAMUSCULAR; INTRAVENOUS at 10:12:00

## 2019-11-22 RX ADMIN — NEOSTIGMINE METHYLSULFATE 2 MG: 1 INJECTION INTRAVENOUS at 10:12:00

## 2019-11-22 RX ADMIN — ONDANSETRON 4 MG: 2 INJECTION INTRAMUSCULAR; INTRAVENOUS at 09:57:00

## 2019-11-22 RX ADMIN — DEXAMETHASONE SODIUM PHOSPHATE 8 MG: 4 MG/ML VIAL (ML) INJECTION at 09:00:00

## 2019-11-22 RX ADMIN — ROCURONIUM BROMIDE 40 MG: 10 INJECTION, SOLUTION INTRAVENOUS at 08:39:00

## 2019-11-22 RX ADMIN — MIDAZOLAM HYDROCHLORIDE 3 MG: 1 INJECTION INTRAMUSCULAR; INTRAVENOUS at 08:29:00

## 2019-11-22 NOTE — PLAN OF CARE
Pt declining to see physical therapy, declining home health care. & stating he wants to go home soon. Ortho md notified, ok to not see pt/ot or have home health. Ok to Joby Smith md consult order. irwin villarreal notified. Will continue to monitor.   Ortho md notified of

## 2019-11-22 NOTE — ANESTHESIA POSTPROCEDURE EVALUATION
[facility] Patient Status:  Surgery Admit - Inpt   Age/Gender 48year old male MRN [id number]   [facility] SURGERY Attending Juanito Olson MD   Hosp Day # 0 PCP Peggy Fisher MD       Anesthesia Post-op Note    Pro

## 2019-11-22 NOTE — BRIEF OP NOTE
Pre-Operative Diagnosis: Primary osteoarthritis of right shoulder [M19.011]     Post-Operative Diagnosis: Primary osteoarthritis of right shoulder [M19.011]      Procedure Performed:   Procedure(s):  RIGHT TOTAL SHOULDER ARTHROPLASTY    Surgeon(s) and Role

## 2019-11-22 NOTE — ANESTHESIA PROCEDURE NOTES
Regional Block  Performed by: India Frausto MD  Authorized by: India Frausto MD       General Information and Staff    Start Time:  11/22/2019 8:28 AM  End Time:  11/22/2019 8:35 AM  Anesthesiologist:  India Frausto MD  Performed by:   Anesthesiologist  Yisel José

## 2019-11-22 NOTE — INTERVAL H&P NOTE
Pre-op Diagnosis: Primary osteoarthritis of right shoulder [M19.011]    The above referenced H&P was reviewed by ELEN Leahy on 01/86/3094, the patient was examined and no significant changes have occurred in the patient's condition since the H&P wa

## 2019-11-22 NOTE — ANESTHESIA PROCEDURE NOTES
Airway  Date/Time: 11/22/2019 8:45 AM  Urgency: elective      General Information and Staff    Patient location during procedure: OR  Anesthesiologist: Jose Rafael Ayers MD  Performed: anesthesiologist     Indications and Patient Condition  Indications for air

## 2019-11-22 NOTE — PLAN OF CARE
Pt & family verbalized understanding of poc & dc instructions written instructions given with rx for pain meds.

## 2019-11-22 NOTE — ANESTHESIA PREPROCEDURE EVALUATION
PRE-OP EVALUATION    Patient Name: Trevor Parker    Pre-op Diagnosis: Primary osteoarthritis of right shoulder [M19.011]    Procedure(s):  RIGHT TOTAL SHOULDER ARTHROPLASTY    Surgeon(s) and Role:     Pretty Wagner MD - Primary    Pre-op vitals review 1 Bottle, Rfl: 2  traMADol HCl 50 MG Oral Tab, Take 1-2 tablets ( mg total) by mouth every 6 (six) hours as needed for Pain., Disp: 20 tablet, Rfl: 0  SUMAtriptan Succinate 100 MG Oral Tab, TAKE 1 TABLET AS NEEDED FOR HEADACHE, Disp: 18 tablet, Rfl: reconstruction of the metacarpophalangeal joint using a palmaris longus autograft    • HERNIA REPAIR INGUINAL ADULT Right 3/4/2019    Performed by Pan Williamson MD at [facility]   • HERNIA SURGERY  03/04/2019    Right inguinal hernia MRI = OK) - PER [employer]   • SPINAL CORD STIMULATOR IMPLANTATION Bilateral 2/8/2016    Performed by Macrina Mccullough MD at [facility] N/A 1/25/2016    Performed by Macrina Mccullough MD at [address] [place] [address]

## 2019-11-23 NOTE — OPERATIVE REPORT
[address]    PATIENT'S NAME: Magda Bookbinder   ATTENDING PHYSICIAN: Kanu Zaomra M.D. OPERATING PHYSICIAN: Kanu Zamora M.D.    PATIENT ACCOUNT#:   [de-identified]    LOCATION:  [address] [place] [address] 1958 #:   [id number]       DATE OF BIRTH:  [birth year] The conjoint tendon was identified. A #2 Ethibond suture was placed in the subscapularis, which was released from the lesser tuberosity as the arm was brought into external rotation and then extension.   Humeral head was delivered in the wound and it was into the glenoid, keeping cement away from the porous titanium glenoid post.  I held it in place impacted. The excess cement was removed. I then repositioned my size 13 broach. I did a trial reduction with a 50 x 21 mm head.   I placed the shoulder throu patient elected to leave the hospital as he was feeling fine and he did not feel a need to stay in the hospital.  He required no therapy at this time. He tolerated his procedure. The humeral head was submitted to Pathology.   Blood loss was approximately

## 2020-03-16 NOTE — H&P
[address]    PATIENT'S NAME: Alie York   ATTENDING PHYSICIAN: Quinten Wade M.D.    PATIENT ACCOUNT#:   [de-identified]    LOCATION:    MEDICAL RECORD #:   [id number]       YOB: 1965  ADMISSION DATE:       04/03/2020    HISTORY AND PH flexion and was lifting with his opposite arm. I told the patient I thought he was a little too aggressive with starting range of motion activities while he was still immobilized.   The patient took it upon himself to start a more aggressive range of motio hemicorporectomy with ACDF, L4-5 fusion, colonoscopy, excision of varicoceles bilaterally, osteogenesis stimulator, removal of thyroglossal duct cyst, left shoulder replacement, left ACL reconstruction, right elbow surgery x2.     MEDICATIONS:  Hydrocodone, procedure. The patient does wish to proceed. The patient has no medical contraindication to the proposed surgery.      Dictated By Uzma Tijerina M.D.  d: 03/10/2020 22:03:35  t: 03/10/2020 22:49:32  [facility] [id number]  [facility]/    cc: Maria E Preston

## 2020-03-18 PROBLEM — N39.43 POST-VOID DRIBBLING: Status: ACTIVE | Noted: 2020-03-18

## 2020-03-23 RX ORDER — BENZONATATE 200 MG/1
200 CAPSULE ORAL 3 TIMES DAILY
COMMUNITY

## 2020-05-20 PROBLEM — N32.81 OAB (OVERACTIVE BLADDER): Status: ACTIVE | Noted: 2020-05-20

## 2020-05-20 PROBLEM — R35.1 NOCTURIA: Status: ACTIVE | Noted: 2020-05-20

## 2020-05-20 PROBLEM — N40.1 ENLARGED PROSTATE WITH LOWER URINARY TRACT SYMPTOMS (LUTS): Status: ACTIVE | Noted: 2020-05-20

## 2020-05-20 PROBLEM — R35.0 URINARY FREQUENCY: Status: ACTIVE | Noted: 2020-05-20

## 2020-05-27 PROBLEM — K40.90 LEFT INGUINAL HERNIA: Status: ACTIVE | Noted: 2020-05-27

## 2020-05-27 PROBLEM — R05.3 CHRONIC COUGH: Status: ACTIVE | Noted: 2020-05-27

## 2020-05-28 ENCOUNTER — ANESTHESIA EVENT (OUTPATIENT)
Dept: SURGERY | Facility: HOSPITAL | Age: 55
End: 2020-05-28
Payer: COMMERCIAL

## 2020-06-01 RX ORDER — SCOLOPAMINE TRANSDERMAL SYSTEM 1 MG/1
1 PATCH, EXTENDED RELEASE TRANSDERMAL ONCE
Status: CANCELLED | OUTPATIENT
Start: 2020-06-01 | End: 2020-06-01

## 2020-06-02 ENCOUNTER — LAB ENCOUNTER (OUTPATIENT)
Dept: LAB | Facility: HOSPITAL | Age: 55
End: 2020-06-02
Attending: ORTHOPAEDIC SURGERY
Payer: COMMERCIAL

## 2020-06-02 DIAGNOSIS — Z01.818 PRE-OP TESTING: ICD-10-CM

## 2020-06-03 ENCOUNTER — HOSPITAL ENCOUNTER (OUTPATIENT)
Facility: HOSPITAL | Age: 55
Discharge: HOME | End: 2020-06-03
Attending: ORTHOPAEDIC SURGERY | Admitting: ORTHOPAEDIC SURGERY
Payer: COMMERCIAL

## 2020-06-03 ENCOUNTER — APPOINTMENT (OUTPATIENT)
Dept: GENERAL RADIOLOGY | Facility: HOSPITAL | Age: 55
End: 2020-06-03
Attending: ORTHOPAEDIC SURGERY
Payer: COMMERCIAL

## 2020-06-03 ENCOUNTER — ANESTHESIA (OUTPATIENT)
Dept: SURGERY | Facility: HOSPITAL | Age: 55
End: 2020-06-03
Payer: COMMERCIAL

## 2020-06-03 VITALS
DIASTOLIC BLOOD PRESSURE: 88 MMHG | OXYGEN SATURATION: 100 % | TEMPERATURE: 98 F | WEIGHT: 156 LBS | SYSTOLIC BLOOD PRESSURE: 121 MMHG | HEIGHT: 71 IN | HEART RATE: 71 BPM | BODY MASS INDEX: 21.84 KG/M2 | RESPIRATION RATE: 18 BRPM

## 2020-06-03 DIAGNOSIS — S46.011D TRAUMATIC COMPLETE TEAR OF RIGHT ROTATOR CUFF, SUBSEQUENT ENCOUNTER: ICD-10-CM

## 2020-06-03 DIAGNOSIS — Z96.611 STATUS POST TOTAL SHOULDER REPLACEMENT, RIGHT: ICD-10-CM

## 2020-06-03 DIAGNOSIS — Z01.818 PRE-OP TESTING: Primary | ICD-10-CM

## 2020-06-03 PROBLEM — S46.012A TRAUMATIC COMPLETE TEAR OF LEFT ROTATOR CUFF: Status: ACTIVE | Noted: 2020-06-03

## 2020-06-03 PROCEDURE — 73020 X-RAY EXAM OF SHOULDER: CPT | Performed by: ORTHOPAEDIC SURGERY

## 2020-06-03 PROCEDURE — 0RRJ00Z REPLACEMENT OF RIGHT SHOULDER JOINT WITH REVERSE BALL AND SOCKET SYNTHETIC SUBSTITUTE, OPEN APPROACH: ICD-10-PCS | Performed by: ORTHOPAEDIC SURGERY

## 2020-06-03 PROCEDURE — 0RPJ0JZ REMOVAL OF SYNTHETIC SUBSTITUTE FROM RIGHT SHOULDER JOINT, OPEN APPROACH: ICD-10-PCS | Performed by: ORTHOPAEDIC SURGERY

## 2020-06-03 RX ORDER — DEXAMETHASONE SODIUM PHOSPHATE 10 MG/ML
8 INJECTION, SOLUTION INTRAMUSCULAR; INTRAVENOUS ONCE
Status: CANCELLED | OUTPATIENT
Start: 2020-06-04 | End: 2020-06-04

## 2020-06-03 RX ORDER — NALOXONE HYDROCHLORIDE 0.4 MG/ML
80 INJECTION, SOLUTION INTRAMUSCULAR; INTRAVENOUS; SUBCUTANEOUS AS NEEDED
Status: DISCONTINUED | OUTPATIENT
Start: 2020-06-03 | End: 2020-06-03

## 2020-06-03 RX ORDER — SODIUM CHLORIDE, SODIUM LACTATE, POTASSIUM CHLORIDE, CALCIUM CHLORIDE 600; 310; 30; 20 MG/100ML; MG/100ML; MG/100ML; MG/100ML
INJECTION, SOLUTION INTRAVENOUS CONTINUOUS
Status: DISCONTINUED | OUTPATIENT
Start: 2020-06-03 | End: 2020-06-03

## 2020-06-03 RX ORDER — ACETAMINOPHEN 500 MG
1000 TABLET ORAL 4 TIMES DAILY
Status: CANCELLED | OUTPATIENT
Start: 2020-06-03 | End: 2020-06-05

## 2020-06-03 RX ORDER — HYDROMORPHONE HYDROCHLORIDE 1 MG/ML
0.2 INJECTION, SOLUTION INTRAMUSCULAR; INTRAVENOUS; SUBCUTANEOUS EVERY 2 HOUR PRN
Status: CANCELLED | OUTPATIENT
Start: 2020-06-03 | End: 2020-06-05

## 2020-06-03 RX ORDER — ACETAMINOPHEN 325 MG/1
650 TABLET ORAL ONCE
Status: COMPLETED | OUTPATIENT
Start: 2020-06-03 | End: 2020-06-03

## 2020-06-03 RX ORDER — HYDROMORPHONE HYDROCHLORIDE 1 MG/ML
0.4 INJECTION, SOLUTION INTRAMUSCULAR; INTRAVENOUS; SUBCUTANEOUS EVERY 2 HOUR PRN
Status: CANCELLED | OUTPATIENT
Start: 2020-06-03 | End: 2020-06-05

## 2020-06-03 RX ORDER — TIZANIDINE 2 MG/1
4 TABLET ORAL 3 TIMES DAILY PRN
Status: CANCELLED | OUTPATIENT
Start: 2020-06-03

## 2020-06-03 RX ORDER — KETOROLAC TROMETHAMINE 30 MG/ML
30 INJECTION, SOLUTION INTRAMUSCULAR; INTRAVENOUS EVERY 6 HOURS
Status: CANCELLED | OUTPATIENT
Start: 2020-06-03 | End: 2020-06-04

## 2020-06-03 RX ORDER — OXYCODONE HYDROCHLORIDE 5 MG/1
5 TABLET ORAL EVERY 4 HOURS PRN
Status: CANCELLED | OUTPATIENT
Start: 2020-06-03 | End: 2020-06-05

## 2020-06-03 RX ORDER — CEFAZOLIN SODIUM/WATER 2 G/20 ML
2 SYRINGE (ML) INTRAVENOUS ONCE
Status: COMPLETED | OUTPATIENT
Start: 2020-06-03 | End: 2020-06-03

## 2020-06-03 RX ORDER — SCOLOPAMINE TRANSDERMAL SYSTEM 1 MG/1
1 PATCH, EXTENDED RELEASE TRANSDERMAL ONCE
Status: DISCONTINUED | OUTPATIENT
Start: 2020-06-03 | End: 2020-06-03

## 2020-06-03 RX ORDER — METOCLOPRAMIDE HYDROCHLORIDE 5 MG/ML
INJECTION INTRAMUSCULAR; INTRAVENOUS AS NEEDED
Status: DISCONTINUED | OUTPATIENT
Start: 2020-06-03 | End: 2020-06-03 | Stop reason: SURG

## 2020-06-03 RX ORDER — HYDROMORPHONE HYDROCHLORIDE 1 MG/ML
0.5 INJECTION, SOLUTION INTRAMUSCULAR; INTRAVENOUS; SUBCUTANEOUS EVERY 5 MIN PRN
Status: DISCONTINUED | OUTPATIENT
Start: 2020-06-03 | End: 2020-06-03

## 2020-06-03 RX ORDER — ACETAMINOPHEN 325 MG/1
TABLET ORAL
Status: COMPLETED
Start: 2020-06-03 | End: 2020-06-03

## 2020-06-03 RX ORDER — MIDAZOLAM HYDROCHLORIDE 1 MG/ML
INJECTION INTRAMUSCULAR; INTRAVENOUS AS NEEDED
Status: DISCONTINUED | OUTPATIENT
Start: 2020-06-03 | End: 2020-06-03 | Stop reason: SURG

## 2020-06-03 RX ORDER — ONDANSETRON 2 MG/ML
4 INJECTION INTRAMUSCULAR; INTRAVENOUS AS NEEDED
Status: DISCONTINUED | OUTPATIENT
Start: 2020-06-03 | End: 2020-06-03

## 2020-06-03 RX ORDER — HYDROMORPHONE HYDROCHLORIDE 1 MG/ML
0.8 INJECTION, SOLUTION INTRAMUSCULAR; INTRAVENOUS; SUBCUTANEOUS EVERY 2 HOUR PRN
Status: CANCELLED | OUTPATIENT
Start: 2020-06-03 | End: 2020-06-05

## 2020-06-03 RX ORDER — HYDROCODONE BITARTRATE AND ACETAMINOPHEN 5; 325 MG/1; MG/1
1-2 TABLET ORAL EVERY 4 HOURS PRN
Qty: 40 TABLET | Refills: 0 | Status: SHIPPED | OUTPATIENT
Start: 2020-06-03 | End: 2020-08-12 | Stop reason: ALTCHOICE

## 2020-06-03 RX ORDER — MIDAZOLAM HYDROCHLORIDE 1 MG/ML
1 INJECTION INTRAMUSCULAR; INTRAVENOUS EVERY 5 MIN PRN
Status: DISCONTINUED | OUTPATIENT
Start: 2020-06-03 | End: 2020-06-03

## 2020-06-03 RX ORDER — OXYCODONE HYDROCHLORIDE 5 MG/1
15 TABLET ORAL EVERY 4 HOURS PRN
Status: CANCELLED | OUTPATIENT
Start: 2020-06-03 | End: 2020-06-05

## 2020-06-03 RX ORDER — OXYCODONE HYDROCHLORIDE 5 MG/1
10 TABLET ORAL EVERY 4 HOURS PRN
Status: CANCELLED | OUTPATIENT
Start: 2020-06-03 | End: 2020-06-05

## 2020-06-03 RX ORDER — ACETAMINOPHEN 500 MG
1000 TABLET ORAL ONCE
Status: DISCONTINUED | OUTPATIENT
Start: 2020-06-03 | End: 2020-06-03 | Stop reason: HOSPADM

## 2020-06-03 RX ORDER — DEXAMETHASONE SODIUM PHOSPHATE 4 MG/ML
4 VIAL (ML) INJECTION AS NEEDED
Status: DISCONTINUED | OUTPATIENT
Start: 2020-06-03 | End: 2020-06-03

## 2020-06-03 RX ADMIN — METOCLOPRAMIDE HYDROCHLORIDE 10 MG: 5 INJECTION INTRAMUSCULAR; INTRAVENOUS at 11:40:00

## 2020-06-03 RX ADMIN — MIDAZOLAM HYDROCHLORIDE 2 MG: 1 INJECTION INTRAMUSCULAR; INTRAVENOUS at 11:04:00

## 2020-06-03 RX ADMIN — CEFAZOLIN SODIUM/WATER 2 G: 2 G/20 ML SYRINGE (ML) INTRAVENOUS at 11:03:00

## 2020-06-03 NOTE — H&P
[address] [place]    PATIENT'S NAME: Tunde Munson   ATTENDING PHYSICIAN: Deshaun Devine M.D.    PATIENT ACCOUNT#:   [de-identified]    LOCATION:    MEDICAL RECORD #:   [id number]       YOB: 1965  ADMISSION DATE:       06/03/2020    HISTORY AND P patient has a size 13 mini humeral stem, a 50 x 21 mm humeral head, offset placed inferiorly, and a medium hybrid glenoid. The patient did home therapy and did not want to get injured in therapy.   The patient was moving the shoulder fairly aggressively in bilaterally; osteogenesis stimulator; removal of thyroglossal duct cyst; left shoulder replacement; exploration, left shoulder; left ACL reconstruction; right elbow surgery x2.     MEDICATIONS:  Hydrocodone, duloxetine, sertraline, aripiprazole, vitamin D, patient has no medical contraindication to the proposed surgery.      Dictated By Halina Cr M.D.  d: 06/02/2020 20:04:46  t: 06/02/2020 20:34:14  [facility] [id number]  [facility]/    cc: Halina Cr M.D.

## 2020-06-03 NOTE — ANESTHESIA PROCEDURE NOTES
Regional Block  Performed by: Brandee Akins MD  Authorized by: Brandee Akins MD       General Information and Staff    Start Time:  6/3/2020 11:05 AM  End Time:  6/3/2020 11:09 AM  Anesthesiologist:  Anne John MD  Performed by:   Anesthesiologist

## 2020-06-03 NOTE — INTERVAL H&P NOTE
Pre-op Diagnosis: Status post total shoulder replacement, right [Z96.611]  Traumatic complete tear of right rotator cuff, subsequent encounter [S46.011D]    The above referenced H&P was reviewed by Batool Brown MD on 6/3/2020, the patient was examined an

## 2020-06-03 NOTE — ANESTHESIA PROCEDURE NOTES
Airway  Date/Time: 6/3/2020 11:12 AM  Urgency: elective      General Information and Staff    Patient location during procedure: OR  Anesthesiologist: Juaquin Akins MD  Performed: anesthesiologist     Indications and Patient Condition  Indications for ai

## 2020-06-03 NOTE — ANESTHESIA POSTPROCEDURE EVALUATION
[address] Patient Status:  Outpatient in a Bed   Age/Gender 50year old male MRN [id number]   [facility] SURGERY Attending Neri Childs MD   Hosp Day # 0 PCP Michael Figueroa MD       Anesthesia Post-op Note    Pro

## 2020-06-03 NOTE — ANESTHESIA PREPROCEDURE EVALUATION
PRE-OP EVALUATION    Patient Name: Gretchen President    Pre-op Diagnosis: Status post total shoulder replacement, right [Z96.611]  Traumatic complete tear of right rotator cuff, subsequent encounter [S45.933D]    Procedure(s):  EXPLORATION OF RIGHT ROTATOR C Nasal Suspension, 2 sprays by Each Nare route daily. , Disp: 1 Bottle, Rfl: 11  HYDROcodone-acetaminophen 5-325 MG Oral Tab, Take 1 tablet by mouth every 6 (six) hours as needed for Pain., Disp: 30 tablet, Rfl: 0  Mometasone Furo-Formoterol Fum (DULERA [place]), at [address] N/A 1/17/2014    Performed by Alfie Hooper MD at [address]   • COLONOSCOPY     • EXCISE VARICOCELE Bilateral    • EXTENSOR TENDON REPAIR FINGER Right 2/20/2017    Performed by 5/27/2015    Performed by Rosario Leonard MD at [facility] Right 2/3/2010    Performed by Byron Ma DO at [facility] Left 6/19/2019    Performed by patient meets guidelines. Post-procedure pain management plan discussed with surgeon and patient. Surgeon requests: regional block  Comment: GA with LMA and US-guided peripheral nerve block risks and benefits discussed. Questions answered.   Plan/risks

## 2020-06-03 NOTE — INTERVAL H&P NOTE
Pre-op Diagnosis: Status post total shoulder replacement, right [Z96.611]  Traumatic complete tear of right rotator cuff, subsequent encounter [S46.011D]    The above referenced H&P was reviewed by Alfredo Mcrae MD on 6/3/2020, the patient was examined an

## 2020-06-04 NOTE — OPERATIVE REPORT
[address]    PATIENT'S NAME: Dipesh Nielsen   ATTENDING PHYSICIAN: Neda Hollingsworth M.D. OPERATING PHYSICIAN: Neda Hollingsworth M.D.    PATIENT ACCOUNT#:   [de-identified]    LOCATION:  [address] [place] [address] EDW 10  MEDICAL RECORD #:   [id number]       INGRID the appropriate disimpaction device to remove the humeral head from the humeral component. The humeral component was well fixed. I then turned my attention toward the glenoid.   I placed a Fukuda retractor posteriorly and an anterior glenoid retractor ant mini humeral tray, standard thickness, a +0 mm taper offset, 40 mm diameter and I chose a +0 mm vitamin E highly cross-linked polyethylene bearing surface. I used the standard surface.   I impacted it onto the base plate and then impacted the base plate on

## 2021-01-14 PROBLEM — N39.41 URGE INCONTINENCE: Status: ACTIVE | Noted: 2021-01-14

## 2024-06-24 RX ORDER — PANTOPRAZOLE SODIUM 40 MG/1
20 TABLET, DELAYED RELEASE ORAL 3 TIMES DAILY
COMMUNITY

## 2024-06-24 RX ORDER — ROSUVASTATIN CALCIUM 10 MG/1
10 TABLET, COATED ORAL NIGHTLY
COMMUNITY

## 2024-06-24 RX ORDER — DICYCLOMINE HYDROCHLORIDE 10 MG/1
10 CAPSULE ORAL
COMMUNITY

## 2024-06-24 RX ORDER — SUCRALFATE 1 G/1
1 TABLET ORAL 4 TIMES DAILY
COMMUNITY

## 2024-06-24 RX ORDER — AMITRIPTYLINE HYDROCHLORIDE 10 MG/1
20 TABLET, FILM COATED ORAL NIGHTLY
COMMUNITY

## 2024-06-25 NOTE — PAT NURSING NOTE
S/w Sarah from Dr. Baker's office, she stated per Dr. Baker and GI MD, pt is ok to proceed w/ surgery, he needs medical clearance from PCP and they will follow up w/ PCP.

## 2024-06-27 ENCOUNTER — ANESTHESIA EVENT (OUTPATIENT)
Dept: SURGERY | Facility: HOSPITAL | Age: 59
End: 2024-06-27
Payer: COMMERCIAL

## 2024-06-28 ENCOUNTER — APPOINTMENT (OUTPATIENT)
Dept: GENERAL RADIOLOGY | Facility: HOSPITAL | Age: 59
End: 2024-06-28
Attending: ORTHOPAEDIC SURGERY
Payer: COMMERCIAL

## 2024-06-28 ENCOUNTER — HOSPITAL ENCOUNTER (OUTPATIENT)
Facility: HOSPITAL | Age: 59
Setting detail: HOSPITAL OUTPATIENT SURGERY
Discharge: HOME | End: 2024-06-28
Attending: ORTHOPAEDIC SURGERY | Admitting: ORTHOPAEDIC SURGERY
Payer: COMMERCIAL

## 2024-06-28 ENCOUNTER — HOSPITAL ENCOUNTER (OUTPATIENT)
Facility: HOSPITAL | Age: 59
Discharge: HOME | End: 2024-06-28
Attending: ORTHOPAEDIC SURGERY | Admitting: ORTHOPAEDIC SURGERY
Payer: COMMERCIAL

## 2024-06-28 ENCOUNTER — ANESTHESIA (OUTPATIENT)
Dept: SURGERY | Facility: HOSPITAL | Age: 59
End: 2024-06-28
Payer: COMMERCIAL

## 2024-06-28 VITALS
WEIGHT: 141 LBS | SYSTOLIC BLOOD PRESSURE: 106 MMHG | BODY MASS INDEX: 20.19 KG/M2 | RESPIRATION RATE: 12 BRPM | HEART RATE: 56 BPM | DIASTOLIC BLOOD PRESSURE: 71 MMHG | TEMPERATURE: 98 F | HEIGHT: 70 IN | OXYGEN SATURATION: 100 %

## 2024-06-28 PROCEDURE — 88300 SURGICAL PATH GROSS: CPT | Performed by: ORTHOPAEDIC SURGERY

## 2024-06-28 PROCEDURE — 64415 NJX AA&/STRD BRCH PLXS IMG: CPT | Performed by: ORTHOPAEDIC SURGERY

## 2024-06-28 PROCEDURE — 0PSB04Z REPOSITION LEFT CLAVICLE WITH INTERNAL FIXATION DEVICE, OPEN APPROACH: ICD-10-PCS | Performed by: ORTHOPAEDIC SURGERY

## 2024-06-28 PROCEDURE — 0RRK00Z REPLACEMENT OF LEFT SHOULDER JOINT WITH REVERSE BALL AND SOCKET SYNTHETIC SUBSTITUTE, OPEN APPROACH: ICD-10-PCS | Performed by: ORTHOPAEDIC SURGERY

## 2024-06-28 PROCEDURE — 0RPK0JZ REMOVAL OF SYNTHETIC SUBSTITUTE FROM LEFT SHOULDER JOINT, OPEN APPROACH: ICD-10-PCS | Performed by: ORTHOPAEDIC SURGERY

## 2024-06-28 PROCEDURE — 76000 FLUOROSCOPY <1 HR PHYS/QHP: CPT | Performed by: ORTHOPAEDIC SURGERY

## 2024-06-28 DEVICE — IMPLANTABLE DEVICE
Type: IMPLANTABLE DEVICE | Site: SHOULDER | Status: FUNCTIONAL
Brand: COMPREHENSIVE® REVERSE SHOULDER

## 2024-06-28 DEVICE — IMPLANTABLE DEVICE: Type: IMPLANTABLE DEVICE | Status: FUNCTIONAL

## 2024-06-28 DEVICE — IMPLANTABLE DEVICE: Type: IMPLANTABLE DEVICE | Site: SHOULDER | Status: FUNCTIONAL

## 2024-06-28 DEVICE — IMPLANTABLE DEVICE
Type: IMPLANTABLE DEVICE | Site: SHOULDER | Status: FUNCTIONAL
Brand: COMPREHENSIVE REVERSE SHOULDER

## 2024-06-28 DEVICE — IMPLANTABLE DEVICE
Type: IMPLANTABLE DEVICE | Site: SHOULDER | Status: FUNCTIONAL
Brand: COMPREHENSIVE®

## 2024-06-28 DEVICE — IMPLANTABLE DEVICE
Type: IMPLANTABLE DEVICE | Site: SHOULDER | Status: FUNCTIONAL
Brand: COMPREHENSIVE® VIVACIT-E®

## 2024-06-28 RX ORDER — ONDANSETRON 2 MG/ML
4 INJECTION INTRAMUSCULAR; INTRAVENOUS ONCE
Status: COMPLETED | OUTPATIENT
Start: 2024-06-28 | End: 2024-06-28

## 2024-06-28 RX ORDER — EPHEDRINE SULFATE 50 MG/ML
INJECTION, SOLUTION INTRAVENOUS AS NEEDED
Status: DISCONTINUED | OUTPATIENT
Start: 2024-06-28 | End: 2024-06-28 | Stop reason: SURG

## 2024-06-28 RX ORDER — MIDAZOLAM HYDROCHLORIDE 1 MG/ML
INJECTION INTRAMUSCULAR; INTRAVENOUS
Status: COMPLETED | OUTPATIENT
Start: 2024-06-28 | End: 2024-06-28

## 2024-06-28 RX ORDER — TRANEXAMIC ACID 10 MG/ML
INJECTION, SOLUTION INTRAVENOUS AS NEEDED
Status: DISCONTINUED | OUTPATIENT
Start: 2024-06-28 | End: 2024-06-28 | Stop reason: SURG

## 2024-06-28 RX ORDER — ACETAMINOPHEN 500 MG
1000 TABLET ORAL ONCE
Status: DISCONTINUED | OUTPATIENT
Start: 2024-06-28 | End: 2024-06-28 | Stop reason: HOSPADM

## 2024-06-28 RX ORDER — DEXAMETHASONE SODIUM PHOSPHATE 10 MG/ML
INJECTION, SOLUTION INTRAMUSCULAR; INTRAVENOUS
Status: COMPLETED | OUTPATIENT
Start: 2024-06-28 | End: 2024-06-28

## 2024-06-28 RX ORDER — SCOLOPAMINE TRANSDERMAL SYSTEM 1 MG/1
1 PATCH, EXTENDED RELEASE TRANSDERMAL ONCE
Status: DISCONTINUED | OUTPATIENT
Start: 2024-06-28 | End: 2024-06-28

## 2024-06-28 RX ORDER — CEFAZOLIN SODIUM 1 G/3ML
INJECTION, POWDER, FOR SOLUTION INTRAMUSCULAR; INTRAVENOUS AS NEEDED
Status: DISCONTINUED | OUTPATIENT
Start: 2024-06-28 | End: 2024-06-28 | Stop reason: SURG

## 2024-06-28 RX ORDER — LORAZEPAM 1 MG/1
1 TABLET ORAL
COMMUNITY

## 2024-06-28 RX ORDER — HYDROMORPHONE HYDROCHLORIDE 1 MG/ML
0.4 INJECTION, SOLUTION INTRAMUSCULAR; INTRAVENOUS; SUBCUTANEOUS EVERY 5 MIN PRN
Status: DISCONTINUED | OUTPATIENT
Start: 2024-06-28 | End: 2024-06-28

## 2024-06-28 RX ORDER — VANCOMYCIN HYDROCHLORIDE 1 G/20ML
INJECTION, POWDER, LYOPHILIZED, FOR SOLUTION INTRAVENOUS AS NEEDED
Status: DISCONTINUED | OUTPATIENT
Start: 2024-06-28 | End: 2024-06-28 | Stop reason: HOSPADM

## 2024-06-28 RX ORDER — BUPIVACAINE HYDROCHLORIDE 2.5 MG/ML
INJECTION, SOLUTION EPIDURAL; INFILTRATION; INTRACAUDAL AS NEEDED
Status: DISCONTINUED | OUTPATIENT
Start: 2024-06-28 | End: 2024-06-28 | Stop reason: HOSPADM

## 2024-06-28 RX ORDER — HYDROMORPHONE HYDROCHLORIDE 1 MG/ML
0.2 INJECTION, SOLUTION INTRAMUSCULAR; INTRAVENOUS; SUBCUTANEOUS EVERY 5 MIN PRN
Status: DISCONTINUED | OUTPATIENT
Start: 2024-06-28 | End: 2024-06-28

## 2024-06-28 RX ORDER — ONDANSETRON 2 MG/ML
INJECTION INTRAMUSCULAR; INTRAVENOUS AS NEEDED
Status: DISCONTINUED | OUTPATIENT
Start: 2024-06-28 | End: 2024-06-28 | Stop reason: SURG

## 2024-06-28 RX ORDER — DEXAMETHASONE SODIUM PHOSPHATE 4 MG/ML
VIAL (ML) INJECTION AS NEEDED
Status: DISCONTINUED | OUTPATIENT
Start: 2024-06-28 | End: 2024-06-28 | Stop reason: SURG

## 2024-06-28 RX ORDER — HYDROCODONE BITARTRATE AND ACETAMINOPHEN 10; 325 MG/1; MG/1
1 TABLET ORAL ONCE
Status: COMPLETED | OUTPATIENT
Start: 2024-06-28 | End: 2024-06-28

## 2024-06-28 RX ORDER — SODIUM CHLORIDE, SODIUM LACTATE, POTASSIUM CHLORIDE, CALCIUM CHLORIDE 600; 310; 30; 20 MG/100ML; MG/100ML; MG/100ML; MG/100ML
INJECTION, SOLUTION INTRAVENOUS CONTINUOUS
Status: DISCONTINUED | OUTPATIENT
Start: 2024-06-28 | End: 2024-06-28

## 2024-06-28 RX ORDER — PROCHLORPERAZINE EDISYLATE 5 MG/ML
5 INJECTION INTRAMUSCULAR; INTRAVENOUS ONCE
Status: COMPLETED | OUTPATIENT
Start: 2024-06-28 | End: 2024-06-28

## 2024-06-28 RX ORDER — MEGESTROL ACETATE 40 MG/1
40 TABLET ORAL DAILY
COMMUNITY

## 2024-06-28 RX ORDER — ROCURONIUM BROMIDE 10 MG/ML
INJECTION, SOLUTION INTRAVENOUS AS NEEDED
Status: DISCONTINUED | OUTPATIENT
Start: 2024-06-28 | End: 2024-06-28 | Stop reason: SURG

## 2024-06-28 RX ORDER — NALOXONE HYDROCHLORIDE 0.4 MG/ML
0.08 INJECTION, SOLUTION INTRAMUSCULAR; INTRAVENOUS; SUBCUTANEOUS AS NEEDED
Status: ACTIVE | OUTPATIENT
Start: 2024-06-28 | End: 2024-06-28

## 2024-06-28 RX ORDER — LIDOCAINE HYDROCHLORIDE 10 MG/ML
INJECTION, SOLUTION EPIDURAL; INFILTRATION; INTRACAUDAL; PERINEURAL AS NEEDED
Status: DISCONTINUED | OUTPATIENT
Start: 2024-06-28 | End: 2024-06-28 | Stop reason: SURG

## 2024-06-28 RX ORDER — ROPIVACAINE HYDROCHLORIDE 5 MG/ML
INJECTION, SOLUTION EPIDURAL; INFILTRATION; PERINEURAL
Status: COMPLETED | OUTPATIENT
Start: 2024-06-28 | End: 2024-06-28

## 2024-06-28 RX ADMIN — ONDANSETRON 4 MG: 2 INJECTION INTRAMUSCULAR; INTRAVENOUS at 13:40:00

## 2024-06-28 RX ADMIN — CEFAZOLIN SODIUM 2 G: 1 INJECTION, POWDER, FOR SOLUTION INTRAMUSCULAR; INTRAVENOUS at 10:40:00

## 2024-06-28 RX ADMIN — MIDAZOLAM HYDROCHLORIDE 2 MG: 1 INJECTION INTRAMUSCULAR; INTRAVENOUS at 09:22:00

## 2024-06-28 RX ADMIN — ROCURONIUM BROMIDE 10 MG: 10 INJECTION, SOLUTION INTRAVENOUS at 11:32:00

## 2024-06-28 RX ADMIN — TRANEXAMIC ACID 1000 MG: 10 INJECTION, SOLUTION INTRAVENOUS at 11:05:00

## 2024-06-28 RX ADMIN — ROPIVACAINE HYDROCHLORIDE 30 ML: 5 INJECTION, SOLUTION EPIDURAL; INFILTRATION; PERINEURAL at 09:25:00

## 2024-06-28 RX ADMIN — LIDOCAINE HYDROCHLORIDE 50 MG: 10 INJECTION, SOLUTION EPIDURAL; INFILTRATION; INTRACAUDAL; PERINEURAL at 10:22:00

## 2024-06-28 RX ADMIN — SODIUM CHLORIDE, SODIUM LACTATE, POTASSIUM CHLORIDE, CALCIUM CHLORIDE: 600; 310; 30; 20 INJECTION, SOLUTION INTRAVENOUS at 09:47:00

## 2024-06-28 RX ADMIN — EPHEDRINE SULFATE 10 MG: 50 INJECTION, SOLUTION INTRAVENOUS at 14:23:00

## 2024-06-28 RX ADMIN — ROCURONIUM BROMIDE 50 MG: 10 INJECTION, SOLUTION INTRAVENOUS at 10:22:00

## 2024-06-28 RX ADMIN — DEXAMETHASONE SODIUM PHOSPHATE 10 MG: 10 INJECTION, SOLUTION INTRAMUSCULAR; INTRAVENOUS at 09:25:00

## 2024-06-28 RX ADMIN — DEXAMETHASONE SODIUM PHOSPHATE 4 MG: 4 MG/ML VIAL (ML) INJECTION at 13:40:00

## 2024-06-28 RX ADMIN — SODIUM CHLORIDE, SODIUM LACTATE, POTASSIUM CHLORIDE, CALCIUM CHLORIDE: 600; 310; 30; 20 INJECTION, SOLUTION INTRAVENOUS at 15:52:00

## 2024-06-28 NOTE — ANESTHESIA PREPROCEDURE EVALUATION
Anesthesia PreOp Note    HPI:     Martino DAmico is a 59 year old male who presents for preoperative consultation requested by: Milton Baker MD    Date of Surgery: 6/28/2024    Procedure(s):  Left clavicle open reduction and internal fixation with bone grafting, left shoulder revision to reverse total shoulder arthroplasty  CLAVICLE OPEN REDUCTION INTERNAL FIXATION  Indication: History of shoulder surgery, acute pain left shoulder, instability left shoulder joint    Relevant Problems   No relevant active problems       NPO:                         History Review:  Patient Active Problem List    Diagnosis Date Noted    Urge incontinence 01/14/2021    Traumatic complete tear of left rotator cuff 06/03/2020    Left inguinal hernia 05/27/2020    Chronic cough 05/27/2020    Enlarged prostate with lower urinary tract symptoms (LUTS) 05/20/2020    Urinary frequency 05/20/2020    Nocturia 05/20/2020    OAB (overactive bladder) 05/20/2020    Post-void dribbling 03/18/2020    Left shoulder arthrotomy with joint exploration  Global 09/17/2019 06/20/2019    Acute pain of left shoulder 04/25/2019    Status post replacement of left shoulder joint 03/05/2019    Right inguinal hernia 02/27/2019    Osteoarthritis of left shoulder, unspecified osteoarthritis type  Global 05/02/2019 02/01/2019    osteoarthritis of right shoulder  Global 02/20/2020      Status post total shoulder replacement  Global 09/01/2020 02/01/2019    Scar painful 11/22/2017    Aftercare following surgery 05/23/2017    Sagittal band rupture at metacarpophalangeal joint, subsequent encounter 01/10/2017    Sagittal band rupture at metacarpophalangeal joint, initial encounter 11/18/2016    Hand pain, right 11/18/2016    S/P insertion of spinal cord stimulator 02/16/2016    Bilateral leg and foot pain 10/15/2014    Tobacco use disorder 10/13/2014    S/P lumbar spinal fusion 08/13/2014    Bulge of lumbar disc without myelopathy 08/13/2014    Spondylolisthesis of  cervical region 02/18/2014    Cervicalgia 01/17/2014    Degeneration of cervical intervertebral disc 01/17/2014    Displacement of cervical intervertebral disc without myelopathy 01/17/2014    Cervical spondylosis without myelopathy 01/17/2014    Radiculopathy, cervical 01/01/2014    Stenosis, cervical spine 01/01/2014    Thyroglossal duct cyst 01/01/2014    Cervical radiculopathy at C6 12/25/2013    Lateral epicondylitis 10/17/2011    Tear of tendon of upper extremity 08/24/2011    Lumbar radiculitis 08/27/2010    Postlaminectomy syndrome, lumbar region 08/27/2010    Keratoconus 11/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    Migraine headache 05/29/2009    Allergic rhinitis 05/29/2009       Past Medical History:    Anxiety state    panic attacks    Back problem    Bilateral leg and foot pain    Depression    Diverticulosis of large intestine    GERD    HEADACHES    migraine    High cholesterol    Migraine    Migraines    OTHER DISEASES    keratoconus    Reflux    S/P ACL reconstruction    S/P lumbar spinal fusion    L4-L5    SEASONAL ALLERGIES    Thyroid disease    Visual impairment    contacts       Past Surgical History:   Procedure Laterality Date    Back surgery      fusion L4-L5 - metal present    Back surgery  3/10/14    C5-6 hemicorpcetomy with C5-6 ACDF by hayden Bateman      Elbow arthroscop,part synovect  12/1/2011    Performed by HERMINIO JOY at [facility]    Excise varicocele Bilateral     Fluor gid & loclzj ndl/cath spi dx/ther njx  2/3/10    Performed by EMILY URIOSTEGUI at [facility]    Fluor gid & loclzj ndl/cath spi dx/ther njx  1/17/2014    Procedure: LUMBAR EPIDURAL;  Surgeon: Himanshu Zelaya MD;  Location: [facility]    Fluor gid & loclzj ndl/cath spi dx/ther njx  2/10/2014    Procedure: CERVICAL EPIDURAL;  Surgeon: Himanshu Zelaya MD;  Location: [facility]IN [facility]    Fluor gid & loclzj ndl/cath spi dx/ther njx N/A 8/13/2014    Procedure: LUMBAR EPIDURAL;   Surgeon: Himanshu Zelaya MD;  Location: [facility]IN [facility]    Hand/finger surgery unlisted Right 4/28/17    Right long finger radial sagittal band reconstruction of the metacarpophalangeal joint using a palmaris longus autograft     Hernia surgery  03/04/2019    Right inguinal hernia     Hernia surgery  07/13/2020    [facility]    Injection proc for sacroiliac joint arthrography &/or anesthetic/steroid Right 5/27/2015    Procedure: SACROILIAC JOINT INJECTION;  Surgeon: Danyelle Quesada MD;  Location: [facility]    Injection, anesthetic/steroid, transforaminal epidural; lumbar/sacral, single level  2/3/10    Performed by EMILY URIOSTEGUI at [facility]    Injection, anesthetic/steroid, transforaminal epidural; lumbar/sacral, single level Right 7/8/2015    Procedure: LUMBAR / TRANSFORAMINAL EPIDURAL STEROID INJECTION;  Surgeon: Danyelle Quesada MD;  Location: [facility]    Injection, anesthetic/steroid, transforaminal epidural; lumbar/sacral, single level Bilateral 9/2/2015    Procedure: LUMBAR / TRANSFORAMINAL EPIDURAL STEROID INJECTION;  Surgeon: Danyelle Quesada MD;  Location: [facility]    Injection, w/wo contrast, dx/therapeutic substance, epidural/subarachnoid; cervical/thoracic  1/17/2014    Procedure: CERVICAL EPIDURAL;  Surgeon: Himanshu Zelaya MD;  Location: [facility]    Injection, w/wo contrast, dx/therapeutic substance, epidural/subarachnoid; cervical/thoracic  2/10/2014    Procedure: CERVICAL EPIDURAL;  Surgeon: Himanshu Zelaya MD;  Location: [facility]    Injection, w/wo contrast, dx/therapeutic substance, epidural/subarachnoid; lumbar/sacral N/A 8/13/2014    Procedure: LUMBAR EPIDURAL;  Surgeon: Himanshu Zelaay MD;  Location: [facility]    Ligation hemorrhoid w/us N/A 4/28/2015    Procedure: TRANSANAL HEMORRHOIDAL DEARTERIALIZATION (THD);  Surgeon: Lucas Mo MD;  Location:  [facility]    [facility] yr  1/17/2014    Procedure: LUMBAR EPIDURAL;  Surgeon: Himanshu Zelaya MD;  Location: [facility]IN [facility]    -seda by [facility] 5+ yr  2/10/2014    Procedure: CERVICAL EPIDURAL;  Surgeon: Himanshu Zelaya MD;  Location: [facility]    -seda by [facility] 5+ yr N/A 8/13/2014    Procedure: LUMBAR EPIDURAL;  Surgeon: Himanshu Zelaya MD;  Location: [facility]    M-seda by [facility] 5+ yr N/A 1/25/2016    Procedure: STIMULATOR TRIAL EPIDURAL LEAD;  Surgeon: Himanshu Zelaya MD;  Location: [facility]    Musc/tendon repair each; arm/elbow  8/11/2011    Performed by HERMINIO JOY at [facility]    Osteogensis stimulator, noninvasive, spinal applications      Other Right 2/20/17    Right middle finger radial sagittal band reconstruction and right middle finger extensor tendon centralization procedure     Other      removal of thyroglossal duct cyst    Other Left     shoulder replacement    Other surgical history      left acl replacement    Other surgical history      right elbow surgery x 2    Other surgical history      C5-6    Other surgical history  05/20/2020    Flow US- Dr. Ellis    Other surgical history  07/29/2020    Urolift, Dr. Ellis    Other surgical history  12/02/2020    cysto/botox Dr. Ellis, 100 units    Percut implnt neuroelect,epidural N/A 1/25/2016    Procedure: STIMULATOR TRIAL EPIDURAL LEAD;  Surgeon: Himanshu Zelaya MD;  Location: [facility] PAIN MANAGEMENT    Percut implnt neuroelect,epidural N/A 1/25/2016    Procedure: STIMULATOR TRIAL EPIDURAL LEAD;  Surgeon: Himanshu Zelaya MD;  Location: [facility] PAIN MANA[facility]EMENT    Spinal cord neurostimulator  02/08/2016    **NO FULL BODY MRI** (BRAIN MRI = OK) - PER [employer]       Medications Prior to Admission   Medication Sig Dispense Refill Last Dose    amitriptyline  10 MG Oral Tab Take 2 tablets (20 mg total) by mouth nightly.       dicyclomine 10 MG Oral Cap Take 1 capsule (10 mg total) by mouth 4 (four) times daily before meals and nightly.       pantoprazole 4 mg/mL Oral Solution 5 mL (20 mg total) by Gastric Tube route in the morning, at noon, and at bedtime.       sucralfate 1 g Oral Tab Take 1 tablet (1 g total) by mouth 4 (four) times daily.       rosuvastatin 10 MG Oral Tab Take 1 tablet (10 mg total) by mouth nightly.       ergocalciferol 1.25 MG (65839 UT) Oral Cap Take 1 capsule (50,000 Units total) by mouth every 7 days. 24 capsule 0     diazePAM 10 MG Oral Tab Take 1 tablet (10 mg total) by mouth nightly as needed. 30 tablet 2     HYDROcodone-acetaminophen (NORCO) 5-325 MG Oral Tab Take 1 tablet by mouth every 6 (six) hours as needed for Pain. 15 tablet 0     CROMOLYN SODIUM 4 % Ophthalmic Solution PLACE 1 DROP INTO BOTH EYES 2 (TWO) TIMES DAILY AS NEEDED. 10 mL 0     Sertraline HCl 100 MG Oral Tab Take 2 tablets (200 mg total) by mouth daily. (Patient taking differently: Take 2 tablets (200 mg total) by mouth in the morning and 2 tablets (200 mg total) before bedtime.) 180 tablet 3     AZELASTINE HCL 0.1 % Nasal Solution SPRAY 1 SPRAY INTO EACH NOSTRIL TWICE A DAY 1 Bottle 2     Albuterol Sulfate HFA (PROAIR HFA) 108 (90 Base) MCG/ACT Inhalation Aero Soln Inhale 1 puff into the lungs every 6 (six) hours as needed for Wheezing. 8.5 Inhaler 2     SUMAtriptan Succinate 100 MG Oral Tab TAKE 1 TABLET AS NEEDED FOR HEADACHE 18 tablet 2     Fluticasone Propionate 50 MCG/ACT Nasal Suspension 2 sprays by Each Nare route daily. 1 Bottle 11      Current Facility-Administered Medications Ordered in Epic   Medication Dose Route Frequency Provider Last Rate Last Admin    lactated ringers infusion   Intravenous Continuous Baker, MD Milton        acetaminophen (Tylenol Extra Strength) tab 1,000 mg  1,000 mg Oral Once Baker, MD Milton        ceFAZolin (Ancef) 2g in 10mL IV syringe  premix  2 g Intravenous Once Milton Baker MD         No current [facility]-ordered outpatient medications on file.       Allergies   Allergen Reactions    Clindamycin RASH    Demerol [Meperidine Hcl] RASH    Vicodin [Hydrocodone-Acetaminophen] FACE FLUSHING     SHAKES, SWEATS, HOT AND COLD SPELLS.   CAN TAKE NORCO 5-325 mg.    Codeine ITCHING       Family History   Problem Relation Age of Onset    High Blood Pressure Father     High Cholesterol Father     Diabetes Father     Heart Disorder Father     Diabetes Mother     Cancer Mother         colon CA    Cancer Other         LUNG-CA/LIVER CA-HEPATITIS    Diabetes Brother     High Cholesterol Brother     Asthma Sister     Asthma Sister      Social History     Socioeconomic History    Marital status:     Number of children: 0   Occupational History    Occupation: [employer]   Tobacco Use    Smoking status: Former     Current packs/day: 0.50     Average packs/day: 0.5 packs/day for 20.0 years (10.0 ttl pk-yrs)     Types: Cigarettes    Smokeless tobacco: Never   Vaping Use    Vaping status: Never Used   Substance and Sexual Activity    Alcohol use: No    Drug use: Yes     Types: Cannabis       Available pre-op labs reviewed.             Vital Signs:  Body mass index is 20.23 kg/m².   height is 1.778 m (5' 10\") and weight is 64 kg (141 lb). His oral temperature is 97.8 °F (36.6 °C). His blood pressure is 99/70 and his pulse is 58. His respiration is 16 and oxygen saturation is 99%.   Vitals:    06/24/24 0922 06/28/24 0839   BP:  99/70   Pulse:  58   Resp:  16   Temp:  97.8 °F (36.6 °C)   TempSrc:  Oral   SpO2:  99%   Weight: 66.2 kg (146 lb) 64 kg (141 lb)   Height: 1.778 m (5' 10\")         Anesthesia Evaluation     Patient summary reviewed and Nursing notes reviewed    Airway   Mallampati: II  TM distance: <3 FB  Neck ROM: limited  Dental      Pulmonary     breath sounds clear to auscultation  Cardiovascular   (-) hypertension    Rhythm:  regular  Rate: normal    Neuro/Psych    (+)  neuromuscular disease, anxiety/panic attacks,  depression      Comments: S/p back and Neck surgey    GI/Hepatic/Renal    (+) GERD    Endo/Other    (-) diabetes mellitus  Abdominal                  Anesthesia Plan:   ASA:  3  Plan:   General and regional  Airway:  ETT  Post-op Pain Management: Interscalene block  Informed Consent Plan and Risks Discussed With:  Patient  Discussed plan with:  Attending      I have informed Martino DAmico and/or legal guardian or family member of the nature of the anesthetic plan, benefits, risks including possible dental damage if relevant, major complications, and any alternative forms of anesthetic management.   All of the patient's questions were answered to the best of my ability. The patient desires the anesthetic management as planned.  Sharon Mendez MD  6/28/2024 8:43 AM  Present on Admission:  **None**

## 2024-06-28 NOTE — ANESTHESIA PROCEDURE NOTES
Airway  Date/Time: 6/28/2024 10:23 AM  Urgency: elective    Airway not difficult    General Information and Staff    Patient location during procedure: OR  Anesthesiologist: Sharon Mendez MD  Performed: anesthesiologist   Performed by: Sharon Mendez MD  Authorized by: Sharon Mendez MD      Indications and Patient Condition  Indications for airway management: anesthesia  Sedation level: deep  Preoxygenated: yes  Patient position: sniffing  Mask difficulty assessment: 1 - vent by mask    Final Airway Details  Final airway type: endotracheal airway      Successful airway: ETT  Cuffed: yes   Successful intubation technique: Video laryngoscopy  Endotracheal tube insertion site: oral  Blade: Rik  Blade size: #3  ETT size (mm): 7.5    Placement verified by: capnometry   Measured from: lips  ETT to lips (cm): 22  Number of attempts at approach: 1  Number of other approaches attempted: 0

## 2024-06-28 NOTE — H&P
ORTHO VISIT NOTE      Martino DAmico - MRN: [id number]    HISTORY OF PRESENT ILLNESS: The patient is a 59 year old year old male who comes in today complaining of chronic duration left shoulder pain and instability. He underwent a left anatomic shoulder replacement in February 2019 by Dr. Clay with subsequent instability of the shoulder. Underwent an open exploration of the shoulder with capsulorrhaphy in June 2019 with continued instability in the shoulder. Reports that he feels the shoulder dislocate approximately 3-4 times a week and has learned to self reduce it. Also gives him pain at a 7 out of 10 intensity gives him difficulty with activities. Reports significant weakness in the shoulder. Denies significant limitations in his motion.    Also complains of bony bump overlying the top of the shoulder. Suffered a clavicle fracture in August 2021 and then was seen by me for this in January 2022. At that time he had no pain or symptoms related to the clavicle. Reports that slowly over time he has noted shifting in the area of the clavicle as well as more prominence of the bump. Pain is a 5-6 out of 10 intensity, better with rest, and worse with activity.    Of note, underwent a right anatomic shoulder replacement by Dr. Clay in February 2019 that was revised to a reverse shoulder replacement in June 2019.    History intake form is reviewed.      Past Medical History:  Diagnosis Date  Anxiety state  panic attacks  Back problem  Bilateral leg and foot pain 10/15/2014  Depression  GERD  HEADACHES  migraine  Migraine  Migraines  OTHER DISEASES  keratoconus  Reflux  S/P ACL reconstruction 01/2005  S/P lumbar spinal fusion 09/21/06  L4-L5  SEASONAL ALLERGIES  Thyroid disease  Visual impairment  contacts    Past Surgical History:  Procedure Laterality Date  BACK SURGERY  fusion L4-L5 - metal present  BACK SURGERY 03/10/2014  C5-6 hemicorpcetomy with C5-6 ACDF by hayden FULLER 04/11/2024  COLONOSCOPY  11/12/2014  COLONOSCOPY 10/2023  EGD 01/23/2024  ELBOW ARTHROSCOP,PART SYNOVECT 12/01/2011  Performed by HERMINIO JYO at [facility]  EXCISE VARICOCELE Bilateral  FLUOR GID & LOCLZJ NDL/CATH SPI DX/THER NJX 02/03/2010  Performed by EMILY URIOSTEGUI at [facility]  FLUOR GID & LOCLZJ NDL/CATH SPI DX/THER NJX 01/17/2014  Procedure: LUMBAR EPIDURAL; Surgeon: Himanshu Zelaya MD; Location: [facility]  FLUOR GID & LOCLZJ NDL/CATH SPI DX/THER NJX 02/10/2014  Procedure: CERVICAL EPIDURAL; Surgeon: Himanshu Zelaya MD; Location: [facility]  FLUOR GID & LOCLZJ NDL/CATH SPI DX/THER NJX N/A 08/13/2014  Procedure: LUMBAR EPIDURAL; Surgeon: Himanshu Zelaya MD; Location: [facility]  HAND/FINGER SURGERY UNLISTED Right 04/28/2017  Right long finger radial sagittal band reconstruction of the metacarpophalangeal joint using a palmaris longus autograft  HERNIA SURGERY 03/04/2019  Right inguinal hernia  HERNIA SURGERY 07/13/2020  [facility]  INJECTION PROC FOR SACROILIAC JOINT ARTHROGRAPHY &/OR ANESTHETIC/STEROID Right 05/27/2015  Procedure: SACROILIAC JOINT INJECTION; Surgeon: Danyelle Quesada MD; Location: [facility]  INJECTION, ANESTHETIC/STEROID, TRANSFORAMINAL EPIDURAL; LUMBAR/SACRAL, SINGLE LEVEL 02/03/2010  Performed by EMILY URIOSTEGUI at [facility]  INJECTION, ANESTHETIC/STEROID, TRANSFORAMINAL EPIDURAL; LUMBAR/SACRAL, SINGLE LEVEL Right 07/08/2015  Procedure: LUMBAR / TRANSFORAMINAL EPIDURAL STEROID INJECTION; Surgeon: Danyelle Quesada MD; Location: [facility]  INJECTION, ANESTHETIC/STEROID, TRANSFORAMINAL EPIDURAL; LUMBAR/SACRAL, SINGLE LEVEL Bilateral 09/02/2015  Procedure: LUMBAR / TRANSFORAMINAL EPIDURAL STEROID INJECTION; Surgeon: Danyelle Quesada MD; Location: [facility]  INJECTION, W/WO CONTRAST, DX/THERAPEUTIC SUBSTANCE, EPIDURAL/SUBARACHNOID; CERVICAL/THORACIC  01/17/2014  Procedure: CERVICAL EPIDURAL; Surgeon: Himanshu Zelaya MD; Location: [facility]  INJECTION, W/WO CONTRAST, DX/THERAPEUTIC SUBSTANCE, EPIDURAL/SUBARACHNOID; CERVICAL/THORACIC 02/10/2014  Procedure: CERVICAL EPIDURAL; Surgeon: Himanshu Zelaya MD; Location: [facility]  INJECTION, W/WO CONTRAST, DX/THERAPEUTIC SUBSTANCE, EPIDURAL/SUBARACHNOID; LUMBAR/SACRAL N/A 08/13/2014  Procedure: LUMBAR EPIDURAL; Surgeon: Himanshu Zelaya MD; Location: [facility]  LIGATION HEMORRHOID W/US N/A 04/28/2015  Procedure: TRANSANAL HEMORRHOIDAL DEARTERIALIZATION (THD); Surgeon: Lucas Mo MD; Location: [facility] 5+ YR 01/17/2014  Procedure: LUMBAR EPIDURAL; Surgeon: Himanshu Zelaya MD; Location: [facility]  -SEDA BY  PHYS [facility] 5+ YR 02/10/2014  Procedure: CERVICAL EPIDURAL; Surgeon: Himanshu Zelaya MD; Location: [facility]  M-SEDAJ BY  PHYS [facility] SVC 5+ YR N/A 08/13/2014  Procedure: LUMBAR EPIDURAL; Surgeon: Himanshu Zelaya MD; Location: [facility]  M-SEDAJ BY  PHYS [facility] SV 5+ YR N/A 01/25/2016  Procedure: STIMULATOR TRIAL EPIDURAL LEAD; Surgeon: Himanshu Zelaya MD; Location: [facility]  MUSC/TENDON REPAIR EACH; ARM/ELBOW 08/11/2011  Performed by HERMINIO JOY at [facility]  OSTEOGENSIS STIMULATOR, NONINVASIVE, SPINAL APPLICATIONS  OTHER Right 02/20/2017  Right middle finger radial sagittal band reconstruction and right middle finger extensor tendon centralization procedure  OTHER  removal of thyroglossal duct cyst  OTHER Left  shoulder replacement  OTHER SURGICAL HISTORY  left acl replacement  OTHER SURGICAL HISTORY  right elbow surgery x 2  OTHER SURGICAL HISTORY  C5-6  OTHER SURGICAL HISTORY 05/20/2020  Flow US- Dr. Ellis  OTHER SURGICAL HISTORY 07/29/2020  Urolift, Dr. Ellis  OTHER SURGICAL HISTORY  12/02/2020  cysto/botox Dr. Ellis, 100 units  PERCUT IMPLNT NEUROELECT,EPIDURAL N/A 01/25/2016  Procedure: STIMULATOR TRIAL EPIDURAL LEAD; Surgeon: Himanshu Zelaya MD; Location: [facility]  PERCUT IMPLNT NEUROELECT,EPIDURAL N/A 01/25/2016  Procedure: STIMULATOR TRIAL EPIDURAL LEAD; Surgeon: Himanshu Zelaya MD; Location: [facility]  SPINAL CORD NEUROSTIMULATOR 02/08/2016  **NO FULL BODY MRI** (BRAIN MRI = OK) - PER [employer]    Current Outpatient Medications:  sucralfate 1 g Oral Tab, Take 1 g by mouth 4 (four) times daily., Disp: , Rfl:  LORazepam 1 MG Oral Tab, Take 1 mg by mouth., Disp: , Rfl:  Calcium & Magnesium Carbonates (MYLANTA OR), Take by mouth., Disp: , Rfl:  megestrol 40 MG Oral Tab, Take 1 tablet (40 mg total) by mouth 4 (four) times daily for 120 doses., Disp: 120 tablet, Rfl: 0  Azelastine HCl 137 MCG/SPRAY Nasal Solution, 1 spray by Nasal route 2 (two) times daily., Disp: 3 each, Rfl: 0  DULoxetine 30 MG Oral Cap DR Particles, Take 3 capsules (90 mg total) by mouth daily., Disp: 90 capsule, Rfl: 8  sertraline 100 MG Oral Tab, Take 2 tablets (200 mg total) by mouth daily., Disp: 180 tablet, Rfl: 0  diazePAM 10 MG Oral Tab, Take 1 tablet (10 mg total) by mouth nightly as needed., Disp: 30 tablet, Rfl: 5  rosuvastatin (CRESTOR) 10 MG Oral Tab, Take 1 tablet (10 mg total) by mouth daily., Disp: 90 tablet, Rfl: 3  albuterol (PROAIR HFA) 108 (90 Base) MCG/ACT Inhalation Aero Soln, Inhale 1 puff into the lungs every 6 (six) hours as needed for Wheezing., Disp: 8.5 each, Rfl: 2  SUMAtriptan Succinate 100 MG Oral Tab, TAKE 1 TABLET AS NEEDED FOR HEADACHE, Disp: 18 tablet, Rfl: 0  CROMOLYN SODIUM 4 % Ophthalmic Solution, PLACE 1 DROP INTO BOTH EYES 2 (TWO) TIMES DAILY AS NEEDED., Disp: 30 mL, Rfl: 1    Clindamycin RASH  Demerol [Meperidine* RASH  Dilaudid [[employer]* OTHER (SEE COMMENTS)  Comment:Pt has the shakes  Codeine ITCHING    Social History  Tobacco  Use  Smoking status: Former  Packs/day: 0.00  Years: 0.5 packs/day for 20.0 years (10.0 ttl pk-yrs)  Types: Cigarettes  Start date: 2003  Quit date: 2023  Years since quittin.0  Smokeless tobacco: Never  Tobacco comments: Quit: 2024  Vaping Use  Vaping status: Every Day  Substances: Nicotine  Devices: Refillable tank  Alcohol use: Not Currently  Drug use: Yes  Types: Cannabis  Comment: daily: helps with pain (THC) pt has medical mariguana    A comprehensive 10 point review of systems was completed. Pertinent positives and negatives noted in the the HPI.    PHYSICAL EXAMINATION: Exam of his left shoulder demonstrates well-healed anterior surgical scar, visible bump to the top of the shoulder likely clavicle fracture deformity. The patient's active ROM was forward flexion and abduction to 155 degrees, ER to 95 degrees, and IR to sacrum. Positive Neer and Acosta exam. Weak belly press and lift-off exam. 4- out of 5 strength in his rotator cuff muscles at the side and in his deltoid. Sensation intact to light touch in the Axillary, Radial, Median, and Ulnar distributions. +2 radial pulse palpable. Negative spurling's exam. Positive shoulder apprehension/relocation exam. +3 anterior load-and-shift.    IMAGING: X-rays of the patient's left shoulder demonstrate anatomic shoulder arthroplasty in place with no sign of hardware loosening or failure, shoulder subluxation/dislocation noted on axillary view.    IMPRESSION: H/o shoulder surgery (primary encounter diagnosis)  Acute pain of left shoulder    PLAN: The diagnosis and expected treatment course were extensively discussed with him. Due to the instability he is having in his shoulder with multiple dislocations per week I recommend revision surgery. Left anatomic shoulder replacement will likely be revised to a reverse shoulder replacement with ORIF of the left clavicle. The current implants in her Biomet and I would likely use Biomet implants for the  replacement. Risks, benefits, alternative surgery discussed with him. Risk discussed include bleeding, infection, stiffness, nerve damage, blood clots, pain after surgery, nonhealing of the clavicle once repaired, and need for future surgery. He understands the risks and wishes to proceed with surgery. My surgical schedulers information was given to him and he will call to schedule surgery for next available at his convenience. CT for preoperative planning ordered today. ESR/CRP to rule out infection also ordered today.    All questions answered.    Milton Baker MD.  Electronically signed by Milton Baker MD at 05/22/2024 12:53 PM CDT     Above copied H&P reviewed on 06/28/24, no significant changes noted.

## 2024-06-28 NOTE — ANESTHESIA POSTPROCEDURE EVALUATION
Patient: Martino DAmico    Procedure Summary       Date: 06/28/24 Room / Location: [facility] MAIN OR 09 / [facility] MAIN OR    Anesthesia Start: 1017 Anesthesia Stop: 1547    Procedures:       Left clavicle open reduction and internal fixation with bone grafting, left shoulder revision to reverse total shoulder arthroplasty (Left: Shoulder)      CLAVICLE OPEN REDUCTION INTERNAL FIXATION (Left: Chest) Diagnosis: (History of shoulder surgery, acute pain left shoulder, instability left shoulder joint)    Surgeons: Milton Baker MD Anesthesiologist: Sharon Mendez MD    Anesthesia Type: general, regional ASA Status: 3            Anesthesia Type: general, regional    Vitals Value Taken Time   /71 06/28/24 1542   Temp 97 06/28/24 1551   Pulse 67 06/28/24 1550   Resp 19 06/28/24 1550   SpO2 94 % 06/28/24 1550   Vitals shown include unfiled device data.    [facility] AN Post Evaluation:   Patient Evaluated in PACU  Patient Participation: complete - patient participated  Pain Score: 0  Pain Management: adequate  Airway Patency:patent  Yes    Nausea/Vomiting: none  Cardiovascular Status: acceptable  Respiratory Status: acceptable  Postoperative Hydration acceptable      Sharon Mendez MD  6/28/2024 3:51 PM

## 2024-06-28 NOTE — OPERATIVE REPORT
Operative Note    Date: 06/28/24    Surgeon: Milton Baker MD    Assistants: Brian Jurado PA  Assistant necessary for patient positioning, retraction, holding arm, implanting implant, and closure.    Preop Diagnosis:  Left  Left Shoulder  1. Cuff Tear Arthropathy  2. Clavicle Fracture Non-union    Postop Diagnosis:  Same    Procedures:   Left  Left Shoulder  1. Reverse Total Shoulder Arthroplasty (82742)  2. Clavicle Fracture ORIF (17640)    Anesthesiologist:   Anesthesiologist [facility]: Sharon Mendez MD    Anesthesia:  Interscalene Block and General    EBL:  150 mL    Implants:  Implant Name Type Inv. Item Serial No.  Lot No. LRB No. Used Action   BASEPLATE ZOE 25MM HA+ ADPT COMPHSVE REV - SNA  BASEPLATE ZOE 25MM HA+ ADPT COMPHSVE REV NA [employer]  [id number] Left 1 Implanted   SCREW BNE 6.5X40MM CNTRL HEX 3.5MM COMPHSVE - SNA  SCREW BNE 6.5X40MM CNTRL HEX 3.5MM COMPHSVE NA [employer]  [id number] Left 1 Implanted   SCREW BNE 4.68S00XL FIX SHLDR HEX 3.5MM - SNA  SCREW BNE 4.69A59MF FIX SHLDR HEX 3.5MM NA [employer]  [id number] Left 1 Implanted   SCREW BNE 4.19H77QT FIX SHLDR HEX 3.5MM - SNA  SCREW BNE 4.85Q30QJ FIX SHLDR HEX 3.5MM NA [employer]  [id number] Left 1 Implanted   SCREW BNE 4.77V15MM FIX SHLDR HEX 3.5MM - SNA  SCREW BNE 4.87F34ZM FIX SHLDR HEX 3.5MM NA [employer]  [id number] Left 1 Implanted   SCREW BNE 4.33U33ST FIX SHLDR HEX 3.5MM - SNA  SCREW BNE 4.22R01SI FIX SHLDR HEX 3.5MM NA [employer]  [id number] Left 1 Implanted   SPHERE ZOE 36MM REG STD TI GLENOSPHERE - SNA  SPHERE ZOE 36MM REG STD TI GLENOSPHERE NA [employer]  [id number] Left 1 Implanted   CUP HUM 36MM STD SHLDR VIVACIT-E COMPHSVE - SNA  CUP HUM 36MM STD SHLDR VIVACIT-E COMPHSVE NA [employer]  [id number] Left 1 Implanted   TRAY HUM 40MM +0MM TAPR OFFSET - SNA  TRAY HUM 40MM +0MM TAPR  OFFSET NA [employer]  [id number] Left 1 Implanted   GRAFT BNE SUB 5ML DBM HA PTTY W/ GRAN BONUS - SNA  GRAFT BNE SUB 5ML DBM HA PTTY W/ GRAN BONUS NA [employer]  [id number] Left 1 Implanted   10 hole superior plate   NA [employer]  NA Left 1 Implanted   SCREW T15 LP BRONSON 3.5X16MM NS - SNA  SCREW T15 LP BRONSON 3.5X16MM NS NA [employer]  NA Left 5 Implanted   SCREW T15 LP BRONSON 3.5X14MM NS - SNA  SCREW T15 LP BRONSON 3.5X14MM NS NA [employer]  NA Left 1 Implanted   3.5 x18 cortical   NA [employer]  NA Left 1 Implanted   SCREW BNE 3.5X14MM DST TIB BRONSON FT MARJAN ALPS - SNA  SCREW BNE 3.5X14MM DST TIB BRONSON FT MARJAN ALPS NA [employer]  NA Left 1 Implanted   2.5x10 vpc screw   NA [employer]  NA Left 1 Implanted   2.5x14 vpc screw   NA [employer]  NA Left 1 Implanted   2.5 x 20 vpc screw   NA [employer]  NA Left 1 Implanted   2.5x24 vpc screw   NA [employer]  NA Left 1 Implanted   IMPLANT RECORD       1 Implanted        Specimens:  Prior humeral head    Complications:  None    Condition:  Stable, transferred to recovery room      OPERATIVE REPORT:     Subscapularis: Peel without suture repair  Rotator Cuff: Full thickness subscapularis tear  Glenoid: Good bone stock available  Biceps: Not visualized  Clavicle: Non-union with interposed fibrous tissue, grossly unstable fragments       PROCEDURE:   On the day of surgery, sharon Contreras was seen in the preoperative area.  I confirmed his identity by name and birthday. We reviewed and confirmed the surgical consent including laterality.  The shoulder was marked with my initials.  We re-reviewed the risks and benefits of the procedure and I answered all additional questions to his satisfaction.      Ben was taken to the operating room in stable condition.  After induction of anesthesia, he was placed in a modified beach chair position  and all bony-prominences well padded. The upper extremity was then prepped and draped in a standard,  sterile fashion.  A mechanical arm campos was used intermittently throughout the case.     A surgical time out was performed where I confirmed laterality as marked by my initials, reaffirmed the consent, noted appropriate imaging studies were in the room, and that preoperative antibiotics had been given within recommended timeframe prior to skin incision.      Relevant landmarks were identified and marked out on the skin including the prior incision.  A deltopectoral exposure extending from just proximal and lateral to the coracoid to the lateral insertion of the deltoid. A 10 blade scalpel was used to make a skin incision and dissection was carried down through subcutaneous tissue to the level of the deltopectoral interval. Significant scar tissue was present and the prior deltopectoral interval was utilized to approach the shoulder, the cephalic vein was not visualized. Blunt finger dissection and butcher elevator were used to free adhesions within the subacromial and subdeltoid bursa to ensure that a Brown retractor could easily be placed.  The coracoacromial ligament which was only partially released for exposure.      The subscapularis was torn with no residual attachment to the lesser tuberosity.  The sub-acromial bursal tissue was peeled from the anterior humerus to visualized the humeral head component, this was done with the arm in adduction and external rotation to protect the axillary nerve.  Inferior capsular release was performed along its humeral insertion and the humeral head was dislocated.  The humeral head component was removed. Any osteophytes were removed circumferentially.      Attention was then turned to the glenoid.  The prior polyethylene component was well fixed the the glenoid. Remnants of the labrum were circumferentially excised. Anterior and inferior capsular releases then took place using bovie electrocautery using finger as retraction inferiorly to protect the axillary nerve.  Retractors  were placed to allow adequate exposure of the glenoid component.  A saggital saw was used to cut the polyethylene component and it was removed in pieces taking care to remove the pegs as well.  The metal in growth peg was well fixed and a reamer was used to ream over the peg to facilitate its removal. A guide pin was placed using the guide in 10 degrees of inferior tilt and the corresponding reamer was used. The proper baseplate was then seated into its final position and secured with the central screw, which achieved excellent osseous purchase resulting in scapular rotation with final tightening.  Additional screws were then placed circumfrentially.    Components were then trialed and the shoulder was reduced and taken through a range of motion demonstrating stability of the prosthesis and appropriate tension on soft tissue structures. Trial components were removed and the final glenosphere was seated into position and malleted into place to secure the Jacinto taper.      Attention was turned back to the humerus.  I re-trialed with a standard humeral tray and again noted the prosthesis to be of appropriate size and orientation. Final humeral tray was seated into position. The shoulder was then reduced.      The wound was irrigated.  1 gram of Vancomycin powder was then sprinkled into the operative field.  The deltopectoral interval was marked with 3 Ethibond sutures and then closed with a running locking 0 Vicryl.  Our attention was then turned to the clavicle.    A standard superior anterior approach to the clavicle was made.  Dissection was carried down through skin and subcutaneous tissue.  The anterior periosteum and muscle was peeled off of the clavicle using electrocautery and a key elevator.  The fracture was immediately visualized.  The ends of the fracture were coticated and significant fibrous tissue was in the fracture gap.  Using a pointed reduction clamp and a lion-jaw clamp the fracture was reduced and  a precontoured 3.5 mm superior clavicle plate was applied to the superior surface of the clavicle.  The plate was clamped to the bone holding the reduction and three 3.5 millimeter screws were inserted both medially and laterally to the fracture line.  One headless compression screw was then placed into the fracture gap.  Fluoroscopic images were taken all throughout this process to ensure proper reduction as well as proper instrumentation of the fracture.  Final fluoroscopic views were taken and saved of the reduced and fixated clavicle.  Closure was then began.     Closure was began by irrigating the operative field.  This is followed by using 0 Vicryl sutures to close the deep musculare and fascial layer, 2-0 Vicryl for the subcutaneous tissue, and a 3-0 monocryl running suture for the skin.  10 cc of 0.5% marcaine with epinephrine were then injected into the subcutaneous tissue.      Deltopectoral closure was then completed in layers with 0-Vicryl, followed by 2-0 vicryl deep dermal sutures, and a running subcuticular monocryl and dermabond for skin. All needle and sponge counts were correct. Sterile dressings were placed and the arm was placed in a sling immobilizer.       Ben tolerated the procedure well with no evident complication at the end of the procedure.  At the end of the procedure all counts were performed and correct.

## 2024-06-28 NOTE — ANESTHESIA PROCEDURE NOTES
Peripheral Block    Date/Time: 6/28/2024 9:22 AM    Performed by: Sharon Mendez MD  Authorized by: Sharon Mendez MD      General Information and Staff    Start Time:  6/28/2024 9:22 AM  End Time:  6/28/2024 9:28 AM  Anesthesiologist:  Sharon Mendez MD  Performed by:  Anesthesiologist  Patient Location:  PACU    Block Placement: Pre Induction  Site Identification: real time ultrasound guided, nerve stimulator and image stored and retrievable    Block site/laterality marked before start: site marked  Reason for Block: at surgeon's request and post-op pain management    Preanesthetic Checklist: 2 patient identifers, IV checked, risks and benefits discussed, monitors and equipment checked, pre-op evaluation, timeout performed, anesthesia consent, sterile technique used, no prohibitive neurological deficits and no local skin infection at insertion site      Procedure Details    Patient Position:  Supine  Prep: ChloraPrep    Monitoring:  Cardiac monitor, continuous pulse ox and blood pressure cuff  Injection Technique:  Single-shot    Needle    Needle Type:  Short-bevel and echogenic  Needle Gauge:  22 G  Needle Length:  25 mm  Needle Localization:  Ultrasound guidance  Reason for Ultrasound Use: appropriate spread of the medication was noted in real time and no ultrasound evidence of intravascular and/or intraneural injection      Muscle Twitch Response: distal twitch      Assessment    Injection Assessment:  Good spread noted, negative resistance, negative aspiration for heme, incremental injection and low pressure  Paresthesia Pain:  None  Heart Rate Change: No    - Patient tolerated block procedure well without evidence of immediate block related complications.     Medications  6/28/2024 9:22 AM  midazolam (VERSED)injection 2mg/2ml - Intravenous   2 mg - 6/28/2024 9:22:00 AM  ropivacaine (NAROPIN) injection 0.5% - Infiltration   30 mL - 6/28/2024 9:25:00 AM  dexamethasone (DECADRON) PF injection 10 mg/ml -  Injection   10 mg - 6/28/2024 9:25:00 AM    Additional Comments    Medication:  Ropivacaine 0.5% 30mL

## 2024-06-28 NOTE — DISCHARGE INSTRUCTIONS
Milton Baker MD      SHOULDER SURGERY    THE OPERATION:  Your operation was done through an incision in front of your shoulder.  Your shoulder was replaced with metal and plastic.    PAIN:  You will be given a prescription for pain medicine.  Have this filled at your local pharmacy or where your insurance plan has made arrangements.  The pills may be taken every four hours for pain if needed.  Do not drive while you are taking the pain medication.    ACTIVITY:  You should rest your shoulder in the sling for the first two weeks.  You may bend and straighten your fingers, wrist, and elbow as much as you desire.  Do not raise your arm up or away from your body on your own.  It is safe to write and eat with your operated arm as long as there is no pain.  Do not carry anything heavier than a dinner fork or a pencil with your operated arm.  Do not use your arm to help you get out of a bed or chair.    SLING:  A sling will help to support the arm and to remind you not to move your arm away from your body.  Wear the sling at night and always wear it out in public.  When you are home and seated, you may remove the sling and support your arm on a pillow.    ICE:  Apply ice to your shoulder for 1 hour, 4 times a day for 3 days after surgery.  This will help reduce swelling and pain.  After that you may apply ice as much as you like.    WOUNDS:  Your surgery was done through an incision in front of your shoulder.  The stitches will absorb and will not need to be removed.  You may remove the large bandage 5 days after surgery.  The incision may be sore, swell, and develop bruising over the next several days.  This will go away and no special care is needed.      BATHING:  It is safe to take a shower 5 days after surgery after you remove the large bandage.  To bathe, remove the sling and let your arm hang by your side.  To wash under your armpit, lean over and let the arm fall away from your body.  DO NOT raise your arm!  You may  gently wash the incision with regular soap and water.  Do not scrub.  Your hand and forearm skin may be dry and peel due to the strong disinfectant soap we use at the time of surgery.    FOLLOW-UP:  Call the office to make an appointment to see me in about 2 weeks after your surgery.  If your have a temperature over 101ºF, severe pain, or redness in your shoulder; please contact the office.  You may be asked to come back to the office early.       HOME INSTRUCTIONS  [facility] HOME CARE INSTRUCTIONS: POST-OP ANESTHESIA  The medication that you received for sedation or general anesthesia can last up to 24 hours. Your judgment and reflexes may be altered, even if you feel like your normal self.      We Recommend:   Do not drive any motor vehicle or bicycle   Avoid mowing the lawn, playing sports, or working with power tools/applicances (power saws, electric knives or mixers)   That you have someone stay with you on your first night home   Do not drink alcohol or take sleeping pills or tranquilizers   Do not sign legal documents within 24 hours of your procedure   If you had a nerve block for your surgery, take extra care not to put any pressure on your arm or hand for 24 hours    It is normal:  For you to have a sore throat if you had a breathing tube during surgery (while you were asleep!). The sore throat should get better within 48 hours. You can gargle with warm salt water (1/2 tsp in 4 oz warm water) or use a throat lozenge for comfort  To feel muscle aches or soreness especially in the abdomen, chest or neck. The achy feeling should go away in the next 24 hours  To feel weak, sleepy or \"wiped out\". Your should start feeling better in the next 24 hours.   To experience mild discomforts such as sore lip or tongue, headache, cramps, gas pains or a bloated feeling in your abdomen.   To experience mild back pain or soreness for a day or two if you had spinal or epidural anesthesia.   If you had laparoscopic surgery, to  feel shoulder pain or discomfort on the day of surgery.   For some patients to have nausea after surgery/anesthesia    If you feel nausea or experience vomiting:   Try to move around less.   Eat less than usual or drink only liquids until the next morning   Nausea should resolve in about 24 hours    If you have a problem when you are at home:    Call your surgeons office   Discharge Instructions: After Your Surgery  You’ve just had surgery. During surgery, you were given medicine called anesthesia to keep you relaxed and free of pain. After surgery, you may have some pain or nausea. This is common. Here are some tips for feeling better and getting well after surgery.   Going home  Your healthcare provider will show you how to take care of yourself when you go home. They'll also answer your questions. Have an adult family member or friend drive you home. For the first 24 hours after your surgery:   Don't drive or use heavy equipment.  Don't make important decisions or sign legal papers.  Take medicines as directed.  Don't drink alcohol.  Have someone stay with you, if needed. They can watch for problems and help keep you safe.  Be sure to go to all follow-up visits with your healthcare provider. And rest after your surgery for as long as your provider tells you to.   Coping with pain  If you have pain after surgery, pain medicine will help you feel better. Take it as directed, before pain becomes severe. Also, ask your healthcare provider or pharmacist about other ways to control pain. This might be with heat, ice, or relaxation. And follow any other instructions your surgeon or nurse gives you.      Stay on schedule with your medicine.     Tips for taking pain medicine  To get the best relief possible, remember these points:   Pain medicines can upset your stomach. Taking them with a little food may help.  Most pain relievers taken by mouth need at least 20 to 30 minutes to start to work.  Don't wait till your pain  becomes severe before you take your medicine. Try to time your medicine so that you can take it before starting an activity. This might be before you get dressed, go for a walk, or sit down for dinner.  Constipation is a common side effect of some pain medicines. Call your healthcare provider before taking any medicines such as laxatives or stool softeners to help ease constipation. Also ask if you should skip any foods. Drinking lots of fluids and eating foods such as fruits and vegetables that are high in fiber can also help. Remember, don't take laxatives unless your surgeon has prescribed them.  Drinking alcohol and taking pain medicine can cause dizziness and slow your breathing. It can even be deadly. Don't drink alcohol while taking pain medicine.  Pain medicine can make you react more slowly to things. Don't drive or run machinery while taking pain medicine.  Your healthcare provider may tell you to take acetaminophen to help ease your pain. Ask them how much you're supposed to take each day. Acetaminophen or other pain relievers may interact with your prescription medicines or other over-the-counter (OTC) medicines. Some prescription medicines have acetaminophen and other ingredients in them. Using both prescription and OTC acetaminophen for pain can cause you to accidentally overdose. Read the labels on your OTC medicines with care. This will help you to clearly know the list of ingredients, how much to take, and any warnings. It may also help you not take too much acetaminophen. If you have questions or don't understand the information, ask your pharmacist or healthcare provider to explain it to you before you take the OTC medicine.   Managing nausea  Some people have an upset stomach (nausea) after surgery. This is often because of anesthesia, pain, or pain medicine, less movement of food in the stomach, or the stress of surgery. These tips will help you handle nausea and eat healthy foods as you get  better. If you were on a special food plan before surgery, ask your healthcare provider if you should follow it while you get better. Check with your provider on how your eating should progress. It may depend on the surgery you had. These general tips may help:   Don't push yourself to eat. Your body will tell you when to eat and how much.  Start off with clear liquids and soup. They're easier to digest.  Next try semi-solid foods as you feel ready. These include mashed potatoes, applesauce, and gelatin.  Slowly move to solid foods. Don’t eat fatty, rich, or spicy foods at first.  Don't force yourself to have 3 large meals a day. Instead eat smaller amounts more often.  Take pain medicines with a small amount of solid food, such as crackers or toast. This helps prevent nausea.  When to call your healthcare provider  Call your healthcare provider right away if you have any of these:   You still have too much pain, or the pain gets worse, after taking the medicine. The medicine may not be strong enough. Or there may be a complication from the surgery.  You feel too sleepy, dizzy, or groggy. The medicine may be too strong.  Side effects such as nausea or vomiting. Your healthcare provider may advise taking other medicines to .  Skin changes such as rash, itching, or hives. This may mean you have an allergic reaction. Your provider may advise taking other medicines.  The incision looks different (for instance, part of it opens up).  Bleeding or fluid leaking from the incision site, and weren't told to expect that.  Fever of 100.4°F (38°C) or higher, or as directed by your provider.  Call 911  Call 911 right away if you have:   Trouble breathing  Facial swelling    If you have obstructive sleep apnea   You were given anesthesia medicine during surgery to keep you comfortable and free of pain. After surgery, you may have more apnea spells because of this medicine and other medicines you were given. The spells may last  longer than normal.    At home:  Keep using the continuous positive airway pressure (CPAP) device when you sleep. Unless your healthcare provider tells you not to, use it when you sleep, day or night. CPAP is a common device used to treat obstructive sleep apnea.  Talk with your provider before taking any pain medicine, muscle relaxants, or sedatives. Your provider will tell you about the possible dangers of taking these medicines.  Contact your provider if your sleeping changes a lot even when taking medicines as directed.  Saira last reviewed this educational content on 10/1/2021  © 7375-1347 [employer]. All rights reserved. This information is not intended as a substitute for professional medical care. Always follow your healthcare professional's instructions.

## 2025-06-06 DIAGNOSIS — R10.9 STOMACH ACHE: Primary | ICD-10-CM

## 2025-06-06 DIAGNOSIS — G89.29 OTHER CHRONIC PAIN: ICD-10-CM

## 2025-06-06 DIAGNOSIS — R93.429 KIDNEY FILLING DEFECT: ICD-10-CM

## 2025-06-25 ENCOUNTER — APPOINTMENT (OUTPATIENT)
Dept: MRI IMAGING | Age: 60
End: 2025-06-25
Attending: INTERNAL MEDICINE

## 2025-06-27 ENCOUNTER — APPOINTMENT (OUTPATIENT)
Dept: MRI IMAGING | Age: 60
End: 2025-06-27
Attending: INTERNAL MEDICINE

## 2025-07-17 ENCOUNTER — HOSPITAL ENCOUNTER (OUTPATIENT)
Dept: MRI IMAGING | Age: 60
Discharge: HOME | End: 2025-07-17
Attending: INTERNAL MEDICINE

## 2025-07-17 DIAGNOSIS — R10.9 STOMACH ACHE: ICD-10-CM

## 2025-07-17 DIAGNOSIS — G89.29 OTHER CHRONIC PAIN: ICD-10-CM

## 2025-07-17 DIAGNOSIS — R93.429 KIDNEY FILLING DEFECT: ICD-10-CM

## 2025-07-17 PROCEDURE — A9585 GADOBUTROL INJECTION: HCPCS

## 2025-07-17 PROCEDURE — 74183 MRI ABD W/O CNTR FLWD CNTR: CPT

## 2025-07-17 PROCEDURE — 10002805 HB CONTRAST AGENT

## 2025-07-17 RX ORDER — GADOBUTROL 604.72 MG/ML
7.5 INJECTION INTRAVENOUS ONCE
Status: COMPLETED | OUTPATIENT
Start: 2025-07-17 | End: 2025-07-17

## 2025-07-17 RX ADMIN — GADOBUTROL 7.5 ML: 604.72 INJECTION INTRAVENOUS at 15:57

## (undated) DEVICE — IMPLANTABLE DEVICE: Type: IMPLANTABLE DEVICE

## (undated) DEVICE — SUT VCRL 2-0 27IN FS-1 ABSRB UD L24MM 3/8 CIR

## (undated) DEVICE — PIN STNMN 3.2MMX9IN FOR COMPHSVE REV

## (undated) DEVICE — COVER,TABLE,60X90,STERILE: Brand: MEDLINE

## (undated) DEVICE — STERILE POLYISOPRENE POWDER-FREE SURGICAL GLOVES: Brand: PROTEXIS

## (undated) DEVICE — INTENDED TO AID IN THE PASSING OF SUTURES THROUGH BONE AND SOFT TISSUE DURING ORTHOPEDIC SURGERY: Brand: HOFFEE SUTURE RETRIEVER

## (undated) DEVICE — SUTURE ETHIBOND 2 OS-4

## (undated) DEVICE — PROXIMATE SKIN STAPLERS (35 WIDE) CONTAINS 35 STAINLESS STEEL STAPLES (FIXED HEAD): Brand: PROXIMATE

## (undated) DEVICE — BOWL CEMENT MIX QUICK-VAC

## (undated) DEVICE — SUTURE VICRYL 0 CP-1

## (undated) DEVICE — SHOULDER STABILIZATION KIT,                                    DISPOSABLE 12 PER BOX

## (undated) DEVICE — LIGHT HANDLE

## (undated) DEVICE — SUTURE VICRYL 2-0 CP-1

## (undated) DEVICE — 3M™ IOBAN™ 2 ANTIMICROBIAL INCISE DRAPE 6650EZ: Brand: IOBAN™ 2

## (undated) DEVICE — 4.8MM ROUND FAST CUTTING BUR

## (undated) DEVICE — BLADE SAW 1.14X2.17IN THK0.025IN CUT

## (undated) DEVICE — Device: Brand: COMPREHENSIVE

## (undated) DEVICE — GAMMEX® PI HYBRID SIZE 8.5, STERILE POWDER-FREE SURGICAL GLOVE, POLYISOPRENE AND NEOPRENE BLEND: Brand: GAMMEX

## (undated) DEVICE — DRAPE,U/ SHT,SPLIT,PLAS,STERIL: Brand: MEDLINE

## (undated) DEVICE — BIT DRL 2.7MM PERI SCR

## (undated) DEVICE — KENDALL SCD EXPRESS SLEEVES, KNEE LENGTH, MEDIUM: Brand: KENDALL SCD

## (undated) DEVICE — SHOULDER P.A.D II: Brand: DEROYAL

## (undated) DEVICE — TOWEL,OR,DSP,ST,BLUE,DLX,2/PK,40PK/CS: Brand: MEDLINE

## (undated) DEVICE — SOL  .9 1000ML BTL

## (undated) DEVICE — GAMMEX® PI HYBRID SIZE 6, STERILE POWDER-FREE SURGICAL GLOVE, POLYISOPRENE AND NEOPRENE BLEND: Brand: GAMMEX

## (undated) DEVICE — SOLUTION IRRIG 3000ML 0.9% NACL FLX CONT

## (undated) DEVICE — Device: Brand: STABLECUT®

## (undated) DEVICE — Device

## (undated) DEVICE — YANKAUER,FLEXIBLE HANDLE,REGLR CAPACITY: Brand: MEDLINE INDUSTRIES, INC.

## (undated) DEVICE — CHLORAPREP ORANGE TINT 10.5ML

## (undated) DEVICE — 1010 S-DRAPE TOWEL DRAPE 10/BX: Brand: STERI-DRAPE™

## (undated) DEVICE — ABDOMINAL PAD: Brand: DERMACEA

## (undated) DEVICE — NON-ADHERENT STRIPS,OIL EMULSION: Brand: CURITY

## (undated) DEVICE — GOWN SUR 3XL LEV 3 BLU W/ GRN NK BND AERO BLU

## (undated) DEVICE — 3M™ MICROFOAM™ TAPE 1528-4: Brand: 3M™ MICROFOAM™

## (undated) DEVICE — SUT ETHBND XL 2 30IN V-37 NABSRB GRN 40MM 1/2

## (undated) DEVICE — FAN SPRAY KIT: Brand: PULSAVAC®

## (undated) DEVICE — Device: Brand: STEINMANN PIN

## (undated) DEVICE — ORTHO CDS-LF: Brand: MEDLINE INDUSTRIES, INC.

## (undated) DEVICE — HANDPIECE SET WITH HIGH FLOW TIP AND SUCTION TUBE: Brand: INTERPULSE

## (undated) DEVICE — GAMMEX® PI HYBRID SIZE 9, STERILE POWDER-FREE SURGICAL GLOVE, POLYISOPRENE AND NEOPRENE BLEND: Brand: GAMMEX

## (undated) DEVICE — 2.3MM X 19.0MM TAPERED ROUTER

## (undated) DEVICE — COVER,MAYO STAND,STERILE: Brand: MEDLINE

## (undated) DEVICE — 3M™ IOBAN™ 2 ANTIMICROBIAL INCISE DRAPE 6651EZ: Brand: IOBAN™ 2

## (undated) DEVICE — PACK CDS SHOULDER

## (undated) DEVICE — INTENDED USE FOR SURGICAL MARKING ON INTACT SKIN, ALSO PROVIDES A PERMANENT METHOD OF IDENTIFYING OBJECTS IN THE OPERATING ROOM: Brand: WRITESITE® PLUS MINI PREP RESISTANT MARKER

## (undated) DEVICE — HOOD: Brand: FLYTE

## (undated) DEVICE — BLADE ELECTRODE: Brand: EDGE

## (undated) DEVICE — GOWN,SIRUS,FABRIC-REINFORCED,X-LARGE: Brand: MEDLINE

## (undated) DEVICE — BIT DRL 2.7MM PERIPH FOR COMPHSVE REV

## (undated) DEVICE — SUT MCRYL 3-0 27IN ABSRB UD L24MM PS-1

## (undated) DEVICE — SUT COAT VCRL 0 27IN CP-1 ABSRB UD 36MM 1/2

## (undated) DEVICE — CONVERTORS STOCKINETTE: Brand: CONVERTORS

## (undated) DEVICE — SPONGE LAP 18X18IN WHT COT 4 PLY FLD STRUNG

## (undated) DEVICE — GAMMEX® PI HYBRID SIZE 7.5, STERILE POWDER-FREE SURGICAL GLOVE, POLYISOPRENE AND NEOPRENE BLEND: Brand: GAMMEX

## (undated) DEVICE — SHEET,DRAPE,53X77,STERILE: Brand: MEDLINE

## (undated) DEVICE — 6.0MM ACORN

## (undated) DEVICE — DRESSING AQUACEL AG 3.5 X 10

## (undated) DEVICE — PIN FX 9IN STNM CMPRH RVRS SYS: Type: IMPLANTABLE DEVICE

## (undated) DEVICE — GAMMEX® PI HYBRID SIZE 7, STERILE POWDER-FREE SURGICAL GLOVE, POLYISOPRENE AND NEOPRENE BLEND: Brand: GAMMEX

## (undated) DEVICE — BIT DRL 3.2MM CMPRH CNTR SCR

## (undated) DEVICE — DRILL BIT TWIST 1/16 1.6MM

## (undated) DEVICE — ADHESIVE SKIN TOP FOR WND CLSR DERMBND ADV

## (undated) DEVICE — SOLUTION IRRIG 1000ML 0.9% NACL USP BTL

## (undated) DEVICE — WRAP COOLING SHLDR W/ICE PILLO

## (undated) NOTE — LETTER
[facility]  Phone: [phone]  Right Fax: [phone]    ADDRESSEE INFORMATION: SENDER INFORMATION:   To:   Neri Childs MD From: Adela Grover RN     Department: Pre-Admission Testing   Fax Number: [phone] [place]

## (undated) NOTE — LETTER
[facility]  Phone: [phone]  ANTIBIOTIC ALLERGY/SENSITIVITY/CONTRAINDICATION    Sent By:  Zeferino Leonard Date: 19    Patient Name: [facility]  Surgery Date: 2019    CSN: [id number]  Medical Record: [id number]   : 2 recipient, you are hereby notified that any disclosure, distribution, or copying of this information is strictly prohibited.   If you have received this transmission in error, please notify us immediately by telephone, and return the original documents to u

## (undated) NOTE — LETTER
[facility]  Phone: [phone]  OUTSIDE TESTING RESULT REQUEST      TO:   Dr. Sally Ervin and staff Today's Date: 11/14/19    FAX #: [phone]     IMPORTANT: FOR YOUR IMMEDIATE ATTENTION    Please FAX all test results listed